# Patient Record
Sex: FEMALE | Race: BLACK OR AFRICAN AMERICAN | Employment: FULL TIME | URBAN - METROPOLITAN AREA
[De-identification: names, ages, dates, MRNs, and addresses within clinical notes are randomized per-mention and may not be internally consistent; named-entity substitution may affect disease eponyms.]

---

## 2017-02-03 RX ORDER — TRIAMTERENE AND HYDROCHLOROTHIAZIDE 37.5; 25 MG/1; MG/1
CAPSULE ORAL
Qty: 90 CAPSULE | Refills: 0 | Status: SHIPPED | OUTPATIENT
Start: 2017-02-03 | End: 2017-04-30

## 2017-02-03 NOTE — TELEPHONE ENCOUNTER
Hypertensive Medications  Protocol Criteria:  · Appointment scheduled in the past 6 months or in the next 3 months  · BMP or CMP in the past 12 months  · Creatinine result < 2  Recent Visits       Provider Department Primary Dx    6 months ago Nubia De Paz

## 2017-02-22 ENCOUNTER — HOSPITAL ENCOUNTER (OUTPATIENT)
Age: 47
Discharge: HOME | End: 2017-02-22
Attending: EMERGENCY MEDICINE
Payer: COMMERCIAL

## 2017-02-22 VITALS
DIASTOLIC BLOOD PRESSURE: 69 MMHG | TEMPERATURE: 99 F | HEART RATE: 99 BPM | WEIGHT: 147 LBS | SYSTOLIC BLOOD PRESSURE: 108 MMHG | OXYGEN SATURATION: 97 % | HEIGHT: 62 IN | BODY MASS INDEX: 27.05 KG/M2 | RESPIRATION RATE: 12 BRPM

## 2017-02-22 DIAGNOSIS — J06.9 UPPER RESPIRATORY INFECTION WITH COUGH AND CONGESTION: Primary | ICD-10-CM

## 2017-02-22 PROCEDURE — 99213 OFFICE O/P EST LOW 20 MIN: CPT

## 2017-02-22 PROCEDURE — 99204 OFFICE O/P NEW MOD 45 MIN: CPT

## 2017-02-22 PROCEDURE — 99214 OFFICE O/P EST MOD 30 MIN: CPT

## 2017-02-22 RX ORDER — PSEUDOEPHEDRINE HYDROCHLORIDE 30 MG/1
30 TABLET ORAL EVERY 4 HOURS PRN
Qty: 36 TABLET | Refills: 0 | Status: SHIPPED | OUTPATIENT
Start: 2017-02-22 | End: 2017-07-21

## 2017-02-22 RX ORDER — BENZONATATE 200 MG/1
200 CAPSULE ORAL 3 TIMES DAILY PRN
Qty: 15 CAPSULE | Refills: 0 | Status: SHIPPED | OUTPATIENT
Start: 2017-02-22 | End: 2017-02-27

## 2017-02-22 RX ORDER — FLUTICASONE PROPIONATE 50 MCG
1-2 SPRAY, SUSPENSION (ML) NASAL DAILY
Qty: 16 G | Refills: 0 | Status: SHIPPED | OUTPATIENT
Start: 2017-02-22 | End: 2017-03-24

## 2017-02-22 NOTE — ED PROVIDER NOTES
Patient Seen in: [facility]    History   Patient presents with:  Cough/URI    Stated Complaint: Headache; Congestion    HPI    For the last 3 or 4 days patient has had a runny nose, postnasal drip and a cough.   Patient is complaint: Headache; Congestion  Other systems are as noted in HPI. Constitutional and vital signs reviewed. All other systems reviewed and negative except as noted above.     PSFH elements reviewed from today and agreed except as otherwise stated in behavior is normal. Judgment normal.   Nursing note and vitals reviewed.       ED Course   Labs Reviewed - No data to display    MDM           Disposition and Plan     Clinical Impression:  Upper respiratory infection with cough and congestion  (primary enc

## 2017-05-02 RX ORDER — TRIAMTERENE AND HYDROCHLOROTHIAZIDE 37.5; 25 MG/1; MG/1
CAPSULE ORAL
Qty: 90 CAPSULE | Refills: 0 | Status: SHIPPED | OUTPATIENT
Start: 2017-05-02 | End: 2018-07-24

## 2017-05-12 ENCOUNTER — OFFICE VISIT (OUTPATIENT)
Dept: DERMATOLOGY CLINIC | Facility: CLINIC | Age: 47
End: 2017-05-12

## 2017-05-12 DIAGNOSIS — L65.9 ALOPECIA: Primary | ICD-10-CM

## 2017-05-12 PROCEDURE — 99212 OFFICE O/P EST SF 10 MIN: CPT | Performed by: DERMATOLOGY

## 2017-05-12 PROCEDURE — 99202 OFFICE O/P NEW SF 15 MIN: CPT | Performed by: DERMATOLOGY

## 2017-05-29 NOTE — PROGRESS NOTES
Marshall Thomas is a 49year old female. Patient presents with:  Derm Problem: New pt presents with hair loss to top of scalp for 1 month. Pt has been using johan oil that helped with some hair regrowth. Issue began 3 days after hot oil treatment. Diagnosis Date   • Hemorrhoid 1999     hemorrhoidectomy   • Lipid screening 4/18/2014     per NG   • Essential hypertension          Past Surgical History    HEMORRHOIDECTOMY  2002       Social History   Marital Status: Single  Spouse Name: N/A    Years well-nourished, alert oriented in no acute distress.   Exam of involved, appropriate areas of skin performed, including scalp, head, neck, face,nails, hair, external eyes, including conjunctival mucosa, eyelids, lips, external ears, back, chest, abdomen, ar prescriptions requested or ordered in this encounter    Alopecia  (primary encounter diagnosis)    No orders of the defined types were placed in this encounter.        Results From Past 48 Hours:  No results found for this or any previous visit (from the pa

## 2017-07-21 ENCOUNTER — OFFICE VISIT (OUTPATIENT)
Dept: INTERNAL MEDICINE CLINIC | Facility: CLINIC | Age: 47
End: 2017-07-21

## 2017-07-21 VITALS
WEIGHT: 153 LBS | HEART RATE: 89 BPM | HEIGHT: 63.5 IN | BODY MASS INDEX: 26.77 KG/M2 | SYSTOLIC BLOOD PRESSURE: 86 MMHG | TEMPERATURE: 98 F | DIASTOLIC BLOOD PRESSURE: 53 MMHG

## 2017-07-21 DIAGNOSIS — Z00.00 PHYSICAL EXAM: Primary | ICD-10-CM

## 2017-07-21 DIAGNOSIS — Z12.31 VISIT FOR SCREENING MAMMOGRAM: ICD-10-CM

## 2017-07-21 PROCEDURE — 99396 PREV VISIT EST AGE 40-64: CPT | Performed by: INTERNAL MEDICINE

## 2017-07-23 NOTE — PROGRESS NOTES
HPI:    Patient ID: Shayna Marcelo is a 49year old female.     HPI    Physical exam    Denies hx of HTN  Dyazide is for occas leg swelling    BP (!) 86/53 (BP Location: Left arm, Patient Position: Sitting, Cuff Size: adult)   Pulse 89   Temp 98.4 °F (36.9 VITACRAVES) Oral Chew Tab Chew  by mouth.  Disp:  Rfl:      Allergies:No Known Allergies    HISTORY:  Past Medical History:   Diagnosis Date   • Essential hypertension    • Hemorrhoid 1999    hemorrhoidectomy   • Lipid screening 4/18/2014    per [facility] ASSAY, THYROID STIM HORMONE, FREE T4 (FREE         THYROXINE), LIPID PANEL, CBC, PLATELET; NO         DIFFERENTIAL, COMP METABOLIC PANEL (14),         URINALYSIS, ROUTINE        Generally healthy  BP is low  Pepe hx of HTN  Legs no swelling  STOP dyazied

## 2017-07-24 ENCOUNTER — HOSPITAL ENCOUNTER (OUTPATIENT)
Dept: MAMMOGRAPHY | Age: 47
Discharge: HOME | End: 2017-07-24
Attending: INTERNAL MEDICINE
Payer: COMMERCIAL

## 2017-07-24 ENCOUNTER — LAB ENCOUNTER (OUTPATIENT)
Dept: LAB | Age: 47
End: 2017-07-24
Attending: INTERNAL MEDICINE
Payer: COMMERCIAL

## 2017-07-24 DIAGNOSIS — R82.90 ABNORMAL FINDING IN URINE: Primary | ICD-10-CM

## 2017-07-24 DIAGNOSIS — Z12.31 VISIT FOR SCREENING MAMMOGRAM: ICD-10-CM

## 2017-07-24 DIAGNOSIS — Z00.00 PHYSICAL EXAM: ICD-10-CM

## 2017-07-24 LAB
ALBUMIN SERPL BCP-MCNC: 3.7 G/DL (ref 3.5–4.8)
ALBUMIN/GLOB SERPL: 1 {RATIO} (ref 1–2)
ALP SERPL-CCNC: 68 U/L (ref 32–100)
ALT SERPL-CCNC: 20 U/L (ref 14–54)
ANION GAP SERPL CALC-SCNC: 6 MMOL/L (ref 0–18)
AST SERPL-CCNC: 23 U/L (ref 15–41)
BILIRUB SERPL-MCNC: 0.5 MG/DL (ref 0.3–1.2)
BILIRUB UR QL: NEGATIVE
BUN SERPL-MCNC: 8 MG/DL (ref 8–20)
BUN/CREAT SERPL: 9.2 (ref 10–20)
CALCIUM SERPL-MCNC: 8.9 MG/DL (ref 8.5–10.5)
CHLORIDE SERPL-SCNC: 108 MMOL/L (ref 95–110)
CHOLEST SERPL-MCNC: 196 MG/DL (ref 110–200)
CO2 SERPL-SCNC: 26 MMOL/L (ref 22–32)
COLOR UR: YELLOW
CREAT SERPL-MCNC: 0.87 MG/DL (ref 0.5–1.5)
ERYTHROCYTE [DISTWIDTH] IN BLOOD BY AUTOMATED COUNT: 13 % (ref 11–15)
GLOBULIN PLAS-MCNC: 3.6 G/DL (ref 2.5–3.7)
GLUCOSE SERPL-MCNC: 95 MG/DL (ref 70–99)
GLUCOSE UR-MCNC: NEGATIVE MG/DL
HCT VFR BLD AUTO: 38.4 % (ref 35–48)
HDLC SERPL-MCNC: 55 MG/DL
HGB BLD-MCNC: 12.9 G/DL (ref 12–16)
KETONES UR-MCNC: NEGATIVE MG/DL
LDLC SERPL CALC-MCNC: 124 MG/DL (ref 0–99)
LEUKOCYTE ESTERASE UR QL STRIP.AUTO: NEGATIVE
MCH RBC QN AUTO: 27.7 PG (ref 27–32)
MCHC RBC AUTO-ENTMCNC: 33.7 G/DL (ref 32–37)
MCV RBC AUTO: 82.2 FL (ref 80–100)
NITRITE UR QL STRIP.AUTO: NEGATIVE
NONHDLC SERPL-MCNC: 141 MG/DL
OSMOLALITY UR CALC.SUM OF ELEC: 288 MOSM/KG (ref 275–295)
PH UR: 5 [PH] (ref 5–8)
PLATELET # BLD AUTO: 239 K/UL (ref 140–400)
PMV BLD AUTO: 8.3 FL (ref 7.4–10.3)
POTASSIUM SERPL-SCNC: 3.9 MMOL/L (ref 3.3–5.1)
PROT SERPL-MCNC: 7.3 G/DL (ref 5.9–8.4)
PROT UR-MCNC: 30 MG/DL
RBC # BLD AUTO: 4.67 M/UL (ref 3.7–5.4)
RBC #/AREA URNS AUTO: 41 /HPF
SODIUM SERPL-SCNC: 140 MMOL/L (ref 136–144)
SP GR UR STRIP: 1.02 (ref 1–1.03)
T4 FREE SERPL-MCNC: 0.81 NG/DL (ref 0.58–1.64)
TRIGL SERPL-MCNC: 83 MG/DL (ref 1–149)
TSH SERPL-ACNC: 0.98 UIU/ML (ref 0.45–5.33)
UROBILINOGEN UR STRIP-ACNC: <2
VIT C UR-MCNC: NEGATIVE MG/DL
WBC # BLD AUTO: 7.4 K/UL (ref 4–11)
WBC #/AREA URNS AUTO: 2 /HPF

## 2017-07-24 PROCEDURE — 77067 SCR MAMMO BI INCL CAD: CPT | Performed by: INTERNAL MEDICINE

## 2017-07-31 ENCOUNTER — TELEPHONE (OUTPATIENT)
Dept: FAMILY MEDICINE CLINIC | Facility: CLINIC | Age: 47
End: 2017-07-31

## 2017-07-31 ENCOUNTER — OFFICE VISIT (OUTPATIENT)
Dept: OBGYN CLINIC | Facility: CLINIC | Age: 47
End: 2017-07-31

## 2017-07-31 VITALS
HEART RATE: 79 BPM | WEIGHT: 154 LBS | BODY MASS INDEX: 26.62 KG/M2 | HEIGHT: 63.75 IN | SYSTOLIC BLOOD PRESSURE: 104 MMHG | DIASTOLIC BLOOD PRESSURE: 69 MMHG

## 2017-07-31 DIAGNOSIS — Z12.4 SCREENING FOR MALIGNANT NEOPLASM OF CERVIX: ICD-10-CM

## 2017-07-31 DIAGNOSIS — Z01.419 ENCOUNTER FOR GYNECOLOGICAL EXAMINATION WITHOUT ABNORMAL FINDING: Primary | ICD-10-CM

## 2017-07-31 PROCEDURE — 99396 PREV VISIT EST AGE 40-64: CPT | Performed by: OBSTETRICS & GYNECOLOGY

## 2017-07-31 NOTE — TELEPHONE ENCOUNTER
----- Message from Flakito Bolanos MD sent at 7/31/2017 12:01 AM CDT -----  Send letter and copy of test result. MICORSCOPIC HEMATURIA  Repeat Urinalysis and send culture  Was she menstruating?     Other albs are within normal excpet  LDL slightly eleavted

## 2017-07-31 NOTE — H&P
HPI:  The patient is a 48 yo F who presents for WWE. No complaints. LMP 7/23/17. Menses q28d/4-5d/normal flow. Had 1 episode of intermenstrual bleeding in May, but none prior to or since. Denies perimenopausal symptoms.   Currently not sexually activ mouth., Disp: , Rfl:     ALLERGIES:  No Known Allergies      Review of Systems:  Constitutional:  Denies fevers and chills   Cardiovascular:  denies chest pain or palpitations  Respiratory:  denies shortness of breath  Gastrointestinal:  denies heartburn, HPV HIGH RISK , THIN PREP COLLECTION; Future  -     HPV HIGH RISK , THIN PREP COLLECTION  -     THINPREP PAP SMEAR B        1. WWE:   1. Reviewed ASCCP guidelines with the patient -- pap collected  2. Contraception: not sexually active  3.  Breast Health:

## 2017-07-31 NOTE — TELEPHONE ENCOUNTER
Dr Onesimo Nathan,    Results reviewed with pt and confirmed she was menstruating at that time. Do you still want the urinalysis and culture done? I entered it but have not mailed to pt as of yet.

## 2017-08-01 ENCOUNTER — TELEPHONE (OUTPATIENT)
Dept: OBGYN CLINIC | Facility: CLINIC | Age: 47
End: 2017-08-01

## 2017-08-01 LAB — HPV I/H RISK 1 DNA SPEC QL NAA+PROBE: NEGATIVE

## 2017-08-01 NOTE — TELEPHONE ENCOUNTER
----- Message from Brady Bennett DO sent at 8/1/2017  1:37 PM CDT -----  Please notify of normal pap smear and hpv screen. Thanks!

## 2017-09-20 ENCOUNTER — NURSE TRIAGE (OUTPATIENT)
Dept: OTHER | Age: 47
End: 2017-09-20

## 2017-09-20 NOTE — TELEPHONE ENCOUNTER
Action Requested: Summary for Provider     []  Critical Lab, Recommendations Needed  [] Need Additional Advice  []   FYI    []   Need Orders  [] Need Medications Sent to Pharmacy  [x]  Other     SUMMARY: Patient requesting appointment tomorrow after 3 p.m.

## 2017-09-21 ENCOUNTER — OFFICE VISIT (OUTPATIENT)
Dept: INTERNAL MEDICINE CLINIC | Facility: CLINIC | Age: 47
End: 2017-09-21

## 2017-09-21 VITALS
WEIGHT: 153 LBS | BODY MASS INDEX: 27.11 KG/M2 | HEIGHT: 63 IN | DIASTOLIC BLOOD PRESSURE: 71 MMHG | HEART RATE: 79 BPM | SYSTOLIC BLOOD PRESSURE: 108 MMHG | TEMPERATURE: 98 F

## 2017-09-21 DIAGNOSIS — H91.91 HEARING DIFFICULTY OF RIGHT EAR: Primary | ICD-10-CM

## 2017-09-21 DIAGNOSIS — H61.21 IMPACTED CERUMEN OF RIGHT EAR: ICD-10-CM

## 2017-09-21 PROCEDURE — 99212 OFFICE O/P EST SF 10 MIN: CPT | Performed by: INTERNAL MEDICINE

## 2017-09-21 PROCEDURE — 99213 OFFICE O/P EST LOW 20 MIN: CPT | Performed by: INTERNAL MEDICINE

## 2017-09-21 NOTE — PATIENT INSTRUCTIONS
Earwax Removal    The ear canal makes earwax from the canal’s lining. The ears make wax to lubricate and protect the ear canal. The ear canal is the tube that connects the middle ear to the outside of the ear.  The wax protects the ear from bacteria, infe · Don’t use cotton swabs in your ears. Cotton swabs may push wax deeper into the ear canal or damage the eardrum.  Use cotton gauze or a wet washcloth  to gently remove wax on the outside of the ear and around the opening to the ear canal.  · Don't use any © 5482-9260 [address], [address] [place]. All rights reserved. This information is not intended as a substitute for professional medical care. Always follow your healthcare professional's instructions.

## 2017-09-21 NOTE — PROGRESS NOTES
Patient ID: Yusef Lake is a 49year old female. Patient presents with:  Ear Problem: unable to hear out of R ear. Declines flu shot. HISTORY OF PRESENT ILLNESS:   HPI  Patient presents for above.   Having difficulty hearing out of right ear as Reaction to local anesthetic No     Social History Narrative   None on file           PHYSICAL EXAM:      09/21/17  1554   BP: 108/71   Pulse: 79   Temp: 97.8 °F (36.6 °C)   TempSrc: Oral   Weight: 153 lb (69.4 kg)   Height: 5' 3\" (1.6 m)       Body mass

## 2017-09-26 ENCOUNTER — OFFICE VISIT (OUTPATIENT)
Dept: OTOLARYNGOLOGY | Facility: CLINIC | Age: 47
End: 2017-09-26

## 2017-09-26 VITALS
DIASTOLIC BLOOD PRESSURE: 60 MMHG | SYSTOLIC BLOOD PRESSURE: 104 MMHG | HEIGHT: 64 IN | BODY MASS INDEX: 26.12 KG/M2 | WEIGHT: 153 LBS | TEMPERATURE: 98 F

## 2017-09-26 DIAGNOSIS — H61.21 IMPACTED CERUMEN OF RIGHT EAR: Primary | ICD-10-CM

## 2017-09-26 PROCEDURE — 69210 REMOVE IMPACTED EAR WAX UNI: CPT | Performed by: OTOLARYNGOLOGY

## 2017-09-26 PROCEDURE — 99243 OFF/OP CNSLTJ NEW/EST LOW 30: CPT | Performed by: OTOLARYNGOLOGY

## 2017-09-26 PROCEDURE — 99212 OFFICE O/P EST SF 10 MIN: CPT | Performed by: OTOLARYNGOLOGY

## 2017-09-26 NOTE — PATIENT INSTRUCTIONS
Earwax Removal    The ear canal makes earwax from the canal’s lining. The ears make wax to lubricate and protect the ear canal. The ear canal is the tube that connects the middle ear to the outside of the ear.  The wax protects the ear from bacteria, infe · Don’t use cotton swabs in your ears. Cotton swabs may push wax deeper into the ear canal or damage the eardrum.  Use cotton gauze or a wet washcloth  to gently remove wax on the outside of the ear and around the opening to the ear canal.  · Don't use any © 6610-8822 [address], [address] [place]. All rights reserved. This information is not intended as a substitute for professional medical care. Always follow your healthcare professional's instructions.

## 2017-09-26 NOTE — PROGRESS NOTES
Joo Gallagher is a 49year old female. Patient presents with:  Cerumen Impaction: patient presents for ear cleaning    HPI:   Right ear blocked for the last few weeks.  No pain initially but became somewhat painful a few days ago    Current Outpatient Pre Oriented to time, place, person & situation. Appropriate mood and affect. Lymph Detail Normal Submental. Submandibular. Anterior cervical. Posterior cervical. Supraclavicular.    Eyes Normal Conjunctiva - Right: Normal, Left: Normal. Pupil - Right: Normal

## 2018-07-05 ENCOUNTER — CHARTING TRANS (OUTPATIENT)
Dept: OTHER | Age: 48
End: 2018-07-05

## 2018-07-05 ENCOUNTER — LAB SERVICES (OUTPATIENT)
Dept: OTHER | Age: 48
End: 2018-07-05

## 2018-07-05 LAB — RAPID STREP GROUP A: POSITIVE

## 2018-07-07 ENCOUNTER — TELEPHONE (OUTPATIENT)
Dept: OBGYN CLINIC | Facility: CLINIC | Age: 48
End: 2018-07-07

## 2018-07-07 NOTE — TELEPHONE ENCOUNTER
Pt had her last Annual 7/31/17 with Rosetta Minaya and pap and HPV from 7/31/17 were normal.    Pt wants to know when she would be due to have another pap done. Pt has an Annual on 7/27/18. Sent to Rosetta Minaya for he did her pap 7/31/17.

## 2018-07-09 NOTE — TELEPHONE ENCOUNTER
PT NOTIFIED. PT STATES NO ABNORMAL PAP IN PAST 5 YEARS SO IS AWARE SHE MAY NOT NEED ANOTHER PAP FOR A FEW YEARS BUT DR WILL DECIDE AT THE APPT. EXPLAINED NEED FOR YEARLY EXAMS WHEN PAP MAY NOT BE DUE. PT VERBALIZED UNDERSTANDING.   ENCOURAGED TO CALL YOSELIN

## 2018-07-24 ENCOUNTER — OFFICE VISIT (OUTPATIENT)
Dept: INTERNAL MEDICINE CLINIC | Facility: CLINIC | Age: 48
End: 2018-07-24
Payer: COMMERCIAL

## 2018-07-24 VITALS
BODY MASS INDEX: 26.12 KG/M2 | HEART RATE: 77 BPM | WEIGHT: 153 LBS | TEMPERATURE: 98 F | DIASTOLIC BLOOD PRESSURE: 71 MMHG | SYSTOLIC BLOOD PRESSURE: 110 MMHG | HEIGHT: 64 IN

## 2018-07-24 DIAGNOSIS — Z12.31 VISIT FOR SCREENING MAMMOGRAM: ICD-10-CM

## 2018-07-24 DIAGNOSIS — Z00.00 PHYSICAL EXAM: Primary | ICD-10-CM

## 2018-07-24 PROCEDURE — 99396 PREV VISIT EST AGE 40-64: CPT | Performed by: INTERNAL MEDICINE

## 2018-07-27 ENCOUNTER — OFFICE VISIT (OUTPATIENT)
Dept: OBGYN CLINIC | Facility: CLINIC | Age: 48
End: 2018-07-27
Payer: COMMERCIAL

## 2018-07-27 VITALS
BODY MASS INDEX: 27.09 KG/M2 | HEART RATE: 86 BPM | WEIGHT: 154.81 LBS | DIASTOLIC BLOOD PRESSURE: 67 MMHG | HEIGHT: 63.5 IN | SYSTOLIC BLOOD PRESSURE: 102 MMHG

## 2018-07-27 DIAGNOSIS — Z01.419 ENCOUNTER FOR GYNECOLOGICAL EXAMINATION WITHOUT ABNORMAL FINDING: Primary | ICD-10-CM

## 2018-07-27 PROCEDURE — 99396 PREV VISIT EST AGE 40-64: CPT | Performed by: OBSTETRICS & GYNECOLOGY

## 2018-07-27 NOTE — PROGRESS NOTES
Jennifer Stapleton is a 51year old female [facility] Patient's last menstrual period was 07/17/2018 (exact date). Patient presents with:  Gyn Exam: annual exam, colonoscopy question  She has no complaints.   She has occasional hot flashes but her cycles are reg or double vision  Cardiovascular:  denies chest pain or palpitations  Respiratory:  denies shortness of breath  Gastrointestinal:  denies heartburn, abdominal pain, diarrhea or constipation  Genitourinary:  denies dysuria, incontinence, abnormal vaginal di [id number] in 3 years ,rtc 1 year for annual exam, mammogram ordered by her pcp    No orders of the defined types were placed in this encounter.         No prescriptions requested or ordered in this encounter    None

## 2018-07-31 ENCOUNTER — HOSPITAL ENCOUNTER (OUTPATIENT)
Dept: MAMMOGRAPHY | Age: 48
Discharge: HOME | End: 2018-07-31
Attending: INTERNAL MEDICINE
Payer: COMMERCIAL

## 2018-07-31 ENCOUNTER — APPOINTMENT (OUTPATIENT)
Dept: LAB | Age: 48
End: 2018-07-31
Attending: INTERNAL MEDICINE
Payer: COMMERCIAL

## 2018-07-31 DIAGNOSIS — Z12.31 VISIT FOR SCREENING MAMMOGRAM: ICD-10-CM

## 2018-07-31 DIAGNOSIS — R82.90 ABNORMAL FINDING IN URINE: ICD-10-CM

## 2018-07-31 DIAGNOSIS — Z00.00 PHYSICAL EXAM: ICD-10-CM

## 2018-07-31 LAB
ALBUMIN SERPL BCP-MCNC: 3.9 G/DL (ref 3.5–4.8)
ALBUMIN/GLOB SERPL: 1.1 {RATIO} (ref 1–2)
ALP SERPL-CCNC: 67 U/L (ref 32–100)
ALT SERPL-CCNC: 18 U/L (ref 14–54)
ANION GAP SERPL CALC-SCNC: 6 MMOL/L (ref 0–18)
AST SERPL-CCNC: 21 U/L (ref 15–41)
BACTERIA UR QL AUTO: NEGATIVE /HPF
BILIRUB SERPL-MCNC: 0.4 MG/DL (ref 0.3–1.2)
BILIRUB UR QL: NEGATIVE
BUN SERPL-MCNC: 6 MG/DL (ref 8–20)
BUN/CREAT SERPL: 7 (ref 10–20)
CALCIUM SERPL-MCNC: 9.2 MG/DL (ref 8.5–10.5)
CHLORIDE SERPL-SCNC: 105 MMOL/L (ref 95–110)
CHOLEST SERPL-MCNC: 196 MG/DL (ref 110–200)
CLARITY UR: CLEAR
CO2 SERPL-SCNC: 26 MMOL/L (ref 22–32)
COLOR UR: YELLOW
CREAT SERPL-MCNC: 0.86 MG/DL (ref 0.5–1.5)
ERYTHROCYTE [DISTWIDTH] IN BLOOD BY AUTOMATED COUNT: 13.7 % (ref 11–15)
GLOBULIN PLAS-MCNC: 3.5 G/DL (ref 2.5–3.7)
GLUCOSE SERPL-MCNC: 90 MG/DL (ref 70–99)
GLUCOSE UR-MCNC: NEGATIVE MG/DL
HBA1C MFR BLD: 5.3 % (ref 4–6)
HCT VFR BLD AUTO: 38.8 % (ref 35–48)
HDLC SERPL-MCNC: 59 MG/DL
HGB BLD-MCNC: 12.9 G/DL (ref 12–16)
KETONES UR-MCNC: NEGATIVE MG/DL
LDLC SERPL CALC-MCNC: 118 MG/DL (ref 0–99)
LEUKOCYTE ESTERASE UR QL STRIP.AUTO: NEGATIVE
MCH RBC QN AUTO: 28.3 PG (ref 27–32)
MCHC RBC AUTO-ENTMCNC: 33.3 G/DL (ref 32–37)
MCV RBC AUTO: 85.1 FL (ref 80–100)
NITRITE UR QL STRIP.AUTO: NEGATIVE
NONHDLC SERPL-MCNC: 137 MG/DL
OSMOLALITY UR CALC.SUM OF ELEC: 281 MOSM/KG (ref 275–295)
PATIENT FASTING: YES
PH UR: 5 [PH] (ref 5–8)
PLATELET # BLD AUTO: 269 K/UL (ref 140–400)
PMV BLD AUTO: 7.8 FL (ref 7.4–10.3)
POTASSIUM SERPL-SCNC: 4 MMOL/L (ref 3.3–5.1)
PROT SERPL-MCNC: 7.4 G/DL (ref 5.9–8.4)
PROT UR-MCNC: NEGATIVE MG/DL
RBC # BLD AUTO: 4.56 M/UL (ref 3.7–5.4)
RBC #/AREA URNS AUTO: 1 /HPF
RBC #/AREA URNS AUTO: <1 /HPF
SODIUM SERPL-SCNC: 137 MMOL/L (ref 136–144)
SP GR UR STRIP: 1.02 (ref 1–1.03)
T4 FREE SERPL-MCNC: 0.65 NG/DL (ref 0.58–1.64)
TRIGL SERPL-MCNC: 95 MG/DL (ref 1–149)
TSH SERPL-ACNC: 0.44 UIU/ML (ref 0.45–5.33)
UROBILINOGEN UR STRIP-ACNC: <2
VIT C UR-MCNC: NEGATIVE MG/DL
WBC # BLD AUTO: 6.2 K/UL (ref 4–11)
WBC #/AREA URNS AUTO: <1 /HPF
WBC #/AREA URNS AUTO: <1 /HPF

## 2018-07-31 PROCEDURE — 85027 COMPLETE CBC AUTOMATED: CPT

## 2018-07-31 PROCEDURE — 36415 COLL VENOUS BLD VENIPUNCTURE: CPT

## 2018-07-31 PROCEDURE — 87086 URINE CULTURE/COLONY COUNT: CPT

## 2018-07-31 PROCEDURE — 81001 URINALYSIS AUTO W/SCOPE: CPT

## 2018-07-31 PROCEDURE — 80061 LIPID PANEL: CPT

## 2018-07-31 PROCEDURE — 81015 MICROSCOPIC EXAM OF URINE: CPT

## 2018-07-31 PROCEDURE — 83036 HEMOGLOBIN GLYCOSYLATED A1C: CPT

## 2018-07-31 PROCEDURE — 80053 COMPREHEN METABOLIC PANEL: CPT

## 2018-07-31 PROCEDURE — 84439 ASSAY OF FREE THYROXINE: CPT

## 2018-07-31 PROCEDURE — 77063 BREAST TOMOSYNTHESIS BI: CPT | Performed by: INTERNAL MEDICINE

## 2018-07-31 PROCEDURE — 77067 SCR MAMMO BI INCL CAD: CPT | Performed by: INTERNAL MEDICINE

## 2018-07-31 PROCEDURE — 84443 ASSAY THYROID STIM HORMONE: CPT

## 2018-08-07 NOTE — PROGRESS NOTES
HPI:    Patient ID: Ian Ward is a 51year old female.     HPI    Physical exam    /71   Pulse 77   Temp 98.4 °F (36.9 °C) (Oral)   Ht 5' 4\" (1.626 m)   Wt 153 lb (69.4 kg)   LMP 07/17/2018 (Exact Date)   BMI 26.26 kg/m²   Wt Readings from Last Problem Relation Age of Onset   • Hypertension Mother    • Breast Cancer Mother 77   • Cancer Maternal Grandmother 61   • Diabetes Paternal Aunt    • Breast Cancer Paternal Aunt 48   • Breast Cancer Paternal Aunt 61      Social History: Smoking status: N DIFFERENTIAL, COMP METABOLIC PANEL (14), FREE         T4 (FREE THYROXINE), LIPID PANEL, URINE         MICROSCOPIC W REFLEX CULTURE, HEMOGLOBIN A1C        Generally healthy        (Z12.31) Visit for screening mammogram  Plan: [facility] 2D+3D SCREENING BILAT

## 2018-10-31 VITALS
SYSTOLIC BLOOD PRESSURE: 116 MMHG | RESPIRATION RATE: 16 BRPM | HEIGHT: 62 IN | HEART RATE: 82 BPM | TEMPERATURE: 98.5 F | OXYGEN SATURATION: 98 % | DIASTOLIC BLOOD PRESSURE: 80 MMHG | BODY MASS INDEX: 28.4 KG/M2 | WEIGHT: 154.32 LBS

## 2019-02-06 ENCOUNTER — TELEPHONE (OUTPATIENT)
Dept: INTERNAL MEDICINE CLINIC | Facility: CLINIC | Age: 49
End: 2019-02-06

## 2019-02-06 NOTE — TELEPHONE ENCOUNTER
She is PPO  Advise  Make an appt with GI  And discuss colonoscopy  Call [phone]   Colonoscopy cannot just be ordered it is a procedure   Consultation with GI required  And indication for procedure and risk benefit discussed during the visit

## 2019-02-19 ENCOUNTER — TELEPHONE (OUTPATIENT)
Dept: INTERNAL MEDICINE CLINIC | Facility: CLINIC | Age: 49
End: 2019-02-19

## 2019-02-19 ENCOUNTER — NURSE TRIAGE (OUTPATIENT)
Dept: FAMILY MEDICINE CLINIC | Facility: CLINIC | Age: 49
End: 2019-02-19

## 2019-02-19 ENCOUNTER — OFFICE VISIT (OUTPATIENT)
Dept: INTERNAL MEDICINE CLINIC | Facility: CLINIC | Age: 49
End: 2019-02-19
Payer: COMMERCIAL

## 2019-02-19 VITALS
TEMPERATURE: 99 F | HEIGHT: 63.5 IN | SYSTOLIC BLOOD PRESSURE: 92 MMHG | WEIGHT: 149 LBS | HEART RATE: 89 BPM | BODY MASS INDEX: 26.08 KG/M2 | DIASTOLIC BLOOD PRESSURE: 58 MMHG

## 2019-02-19 DIAGNOSIS — J02.0 PHARYNGITIS, STREPTOCOCCAL: Primary | ICD-10-CM

## 2019-02-19 PROCEDURE — 99213 OFFICE O/P EST LOW 20 MIN: CPT | Performed by: INTERNAL MEDICINE

## 2019-02-19 PROCEDURE — 99212 OFFICE O/P EST SF 10 MIN: CPT | Performed by: INTERNAL MEDICINE

## 2019-02-19 RX ORDER — AMOXICILLIN AND CLAVULANATE POTASSIUM 875; 125 MG/1; MG/1
1 TABLET, FILM COATED ORAL 2 TIMES DAILY
Qty: 14 TABLET | Refills: 0 | Status: SHIPPED | OUTPATIENT
Start: 2019-02-19 | End: 2019-02-26

## 2019-02-19 NOTE — TELEPHONE ENCOUNTER
Action Requested: Summary for Provider     []  Critical Lab, Recommendations Needed  [] Need Additional Advice  []   FYI    []   Need Orders  [] Need Medications Sent to Pharmacy  []  Other     SUMMARY: Spoke with patient who reports she had a sinus issue

## 2019-02-19 NOTE — PROGRESS NOTES
Patient ID: Patrick Adame is a 51year old female. Patient presents with:  Sore Throat: sore throat, chills, started Saturday.         HISTORY OF PRESENT ILLNESS:   HPI  3 day(s) ago  Fever - Yes, Tmax (102)  Cough - Yes, productive - No, color - N/A  S tobacco: Never Used    Substance and Sexual Activity      Alcohol use: No        Alcohol/week: 0.0 oz      Drug use: No      Sexual activity: Yes        Partners: Male        Birth control/protection: Condom        Comment: none    Lifestyle      Physical appears well-developed and well-nourished. HENT:   Head: Normocephalic.    Right Ear: Tympanic membrane, external ear and ear canal normal.   Left Ear: Tympanic membrane, external ear and ear canal normal.   Nose: Right sinus exhibits no maxillary sinus t

## 2019-02-19 NOTE — PATIENT INSTRUCTIONS
Pharyngitis: Strep (Presumed)    You have pharyngitis (sore throat). The healthcare staff think your sore throat is caused by streptococcus (strep) bacteria. This is often called strep throat.  Strep throat can cause throat pain that is worse when swallow · Lymph nodes that get larger  · Can’t swallow liquids, a lot of drooling, or can’t open mouth wide due to throat pain  · Signs of dehydration, such as very dark urine or no urine, sunken eyes, dizziness  · Trouble breathing or noisy breathing  · Muffled v

## 2019-02-19 NOTE — TELEPHONE ENCOUNTER
Pt called in requesting to have her doctors note from today altered to excuse her from work for the dates of 2/19-2/20. Pt stated that she will return to work on 2/21. Pt is requesting to have her note sent to [facility].

## 2019-02-27 ENCOUNTER — NURSE ONLY (OUTPATIENT)
Dept: GASTROENTEROLOGY | Facility: CLINIC | Age: 49
End: 2019-02-27

## 2019-02-27 NOTE — PROGRESS NOTES
Questionnaire still not filled out. Left detailed message on patient's voicemail that telephone screening needs to be rescheduled or needs to make appointment with nurse practitioner.

## 2019-02-27 NOTE — PROGRESS NOTES
Left message for patient on voicemail  that she did not complete both questionnaires and will either need to complete them today by 4 pm  or need to reschedule.

## 2019-04-16 ENCOUNTER — TELEPHONE (OUTPATIENT)
Dept: INTERNAL MEDICINE CLINIC | Facility: CLINIC | Age: 49
End: 2019-04-16

## 2019-04-16 DIAGNOSIS — Z12.31 VISIT FOR SCREENING MAMMOGRAM: Primary | ICD-10-CM

## 2019-04-22 ENCOUNTER — NURSE ONLY (OUTPATIENT)
Dept: GASTROENTEROLOGY | Facility: CLINIC | Age: 49
End: 2019-04-22

## 2019-04-22 DIAGNOSIS — Z12.11 COLON CANCER SCREENING: Primary | ICD-10-CM

## 2019-04-22 RX ORDER — POLYETHYLENE GLYCOL 3350, SODIUM CHLORIDE, SODIUM BICARBONATE, POTASSIUM CHLORIDE 420; 11.2; 5.72; 1.48 G/4L; G/4L; G/4L; G/4L
POWDER, FOR SOLUTION ORAL
Qty: 1 BOTTLE | Refills: 0 | Status: SHIPPED | OUTPATIENT
Start: 2019-04-22 | End: 2019-05-31 | Stop reason: ALTCHOICE

## 2019-04-22 NOTE — PROGRESS NOTES
46 y/o F patient of Dr. Emily Rosales - no prior CLN, no reported GI sxs. + hx of hemorrhoidectomy. Allergies/meds reviewed. No anemia on 2018 labs. Advise that the patient undergo screening CLN with IV or MAC anesthesia @ [facility], [facility] or [place] with Dr. Britney Crespo.     Savage Bacon

## 2019-04-22 NOTE — PROGRESS NOTES
Forwarded to [facility]. This is pt's first colonoscopy. Meds/allergies reviewed.     Ht-5' 3,5\"--149 lbs--BMI 25.98    Denies:  Any upper or lower GI symptoms  Cardiology problems/CVA/pacemaker/stent  Hypertension  COPD/asthma  Diabetes  Anticoagulants  Sle

## 2019-04-22 NOTE — PROGRESS NOTES
CBLM to schedule procedure. Please transfer to Lina Penny at ext [phone] or [phone] for scheduling. Or please [facility] to [facility] if unavailable.

## 2019-04-22 NOTE — PROGRESS NOTES
Scheduled for:  Colonoscopy - 47387  Provider Name:  Dr. Amarilis Mancia  Date:  6/7/19  Location:  [facility]  Sedation:  IV  Time:  10:45 am (pt is aware to arrive at 9:45 am)  Prep:  Trilyte, Prep instructions were given to pt over the phone, pt verbalized understanding

## 2019-05-31 ENCOUNTER — OFFICE VISIT (OUTPATIENT)
Dept: INTERNAL MEDICINE CLINIC | Facility: CLINIC | Age: 49
End: 2019-05-31
Payer: COMMERCIAL

## 2019-05-31 ENCOUNTER — NURSE TRIAGE (OUTPATIENT)
Dept: OTHER | Age: 49
End: 2019-05-31

## 2019-05-31 ENCOUNTER — HOSPITAL ENCOUNTER (OUTPATIENT)
Dept: GENERAL RADIOLOGY | Facility: HOSPITAL | Age: 49
Discharge: HOME | End: 2019-05-31
Attending: PHYSICIAN ASSISTANT
Payer: COMMERCIAL

## 2019-05-31 VITALS
WEIGHT: 154 LBS | HEIGHT: 63 IN | BODY MASS INDEX: 27.29 KG/M2 | HEART RATE: 91 BPM | DIASTOLIC BLOOD PRESSURE: 72 MMHG | TEMPERATURE: 98 F | SYSTOLIC BLOOD PRESSURE: 111 MMHG

## 2019-05-31 DIAGNOSIS — S92.514S CLOSED NONDISPLACED FRACTURE OF PROXIMAL PHALANX OF LESSER TOE OF RIGHT FOOT, SEQUELA: ICD-10-CM

## 2019-05-31 DIAGNOSIS — S99.921S FOOT INJURY, RIGHT, SEQUELA: ICD-10-CM

## 2019-05-31 DIAGNOSIS — S99.921S FOOT INJURY, RIGHT, SEQUELA: Primary | ICD-10-CM

## 2019-05-31 PROCEDURE — 73630 X-RAY EXAM OF FOOT: CPT | Performed by: PHYSICIAN ASSISTANT

## 2019-05-31 PROCEDURE — 99213 OFFICE O/P EST LOW 20 MIN: CPT | Performed by: PHYSICIAN ASSISTANT

## 2019-05-31 PROCEDURE — 99212 OFFICE O/P EST SF 10 MIN: CPT | Performed by: PHYSICIAN ASSISTANT

## 2019-05-31 NOTE — TELEPHONE ENCOUNTER
Action Requested: Summary for Provider     []  Critical Lab, Recommendations Needed  [] Need Additional Advice  []   FYI    []   Need Orders  [] Need Medications Sent to Pharmacy  []  Other     SUMMARY: Spoke with the patient who reports she hit her 4th di

## 2019-05-31 NOTE — PROGRESS NOTES
HPI:    Patient ID: Alexsandra Ordonez is a 47year old female. HPI   Patient injured her right foot last Sunday. Findings hurt her fourth toe as well as bruised her second toe. Has pain with walking.   Denies any numbness, tingling, bleeding in the are Constitutional: She is oriented to person, place, and time. She appears well-developed and well-nourished. HENT:   Head: Normocephalic and atraumatic. Nose: Nose normal.   Eyes: Pupils are equal, round, and reactive to light.  Conjunctivae and EOM are n

## 2019-06-04 ENCOUNTER — TELEPHONE (OUTPATIENT)
Dept: GASTROENTEROLOGY | Facility: CLINIC | Age: 49
End: 2019-06-04

## 2019-06-04 DIAGNOSIS — Z12.11 COLON CANCER SCREENING: Primary | ICD-10-CM

## 2019-06-04 NOTE — TELEPHONE ENCOUNTER
Rescheduled for:  Colonoscopy - 54310   Provider Name:  Dr. Alex Mendes  Date:  FROM - 6/7/19           TO - 6/21/19  Location:  [facility]  Sedation:  IV  Time:  FROM - 10:45 am           TO - 9:00 am (pt is aware to arrive at 8:00 am)  Prep:  Trilyte, Prep instructio

## 2019-06-04 NOTE — TELEPHONE ENCOUNTER
CBLM to schedule procedure. Please transfer to [email or web address] at ext [phone] or [phone] for scheduling. Or please transfer to [facility] in GI if unavailable.

## 2019-06-05 ENCOUNTER — TELEPHONE (OUTPATIENT)
Dept: GASTROENTEROLOGY | Facility: CLINIC | Age: 49
End: 2019-06-05

## 2019-06-05 DIAGNOSIS — Z12.11 COLON CANCER SCREENING: Primary | ICD-10-CM

## 2019-06-05 NOTE — TELEPHONE ENCOUNTER
Spoke with pt, she will CB to reschedule. Pt CANCELED for her procedure on 6/21/19 at 9:00 am with Dr. Feliberto Dee. I sent an electronic cancel request to Endo Scheduling and received a confirmation today. TE closed.

## 2019-06-06 ENCOUNTER — NURSE TRIAGE (OUTPATIENT)
Dept: INTERNAL MEDICINE CLINIC | Facility: CLINIC | Age: 49
End: 2019-06-06

## 2019-06-06 NOTE — TELEPHONE ENCOUNTER
Action Requested: Summary for Provider     []  Critical Lab, Recommendations Needed  [] Need Additional Advice  [x]   FYI    []   Need Orders  [] Need Medications Sent to Pharmacy  []  Other     SUMMARY: Patient calling with complaint of hair loss on top o

## 2019-06-08 ENCOUNTER — APPOINTMENT (OUTPATIENT)
Dept: LAB | Age: 49
End: 2019-06-08
Attending: INTERNAL MEDICINE
Payer: COMMERCIAL

## 2019-06-08 DIAGNOSIS — R79.89 ABNORMAL THYROID BLOOD TEST: ICD-10-CM

## 2019-06-08 PROCEDURE — 84480 ASSAY TRIIODOTHYRONINE (T3): CPT

## 2019-06-08 PROCEDURE — 36415 COLL VENOUS BLD VENIPUNCTURE: CPT

## 2019-06-08 PROCEDURE — 84443 ASSAY THYROID STIM HORMONE: CPT

## 2019-06-08 PROCEDURE — 80061 LIPID PANEL: CPT

## 2019-06-08 PROCEDURE — 84439 ASSAY OF FREE THYROXINE: CPT

## 2019-06-11 ENCOUNTER — OFFICE VISIT (OUTPATIENT)
Dept: INTERNAL MEDICINE CLINIC | Facility: CLINIC | Age: 49
End: 2019-06-11
Payer: COMMERCIAL

## 2019-06-11 VITALS
BODY MASS INDEX: 26.63 KG/M2 | SYSTOLIC BLOOD PRESSURE: 116 MMHG | HEART RATE: 83 BPM | HEIGHT: 64 IN | WEIGHT: 156 LBS | DIASTOLIC BLOOD PRESSURE: 71 MMHG

## 2019-06-11 DIAGNOSIS — L65.9 HAIR LOSS: Primary | ICD-10-CM

## 2019-06-11 PROCEDURE — 99213 OFFICE O/P EST LOW 20 MIN: CPT | Performed by: INTERNAL MEDICINE

## 2019-06-11 PROCEDURE — 99212 OFFICE O/P EST SF 10 MIN: CPT | Performed by: INTERNAL MEDICINE

## 2019-06-21 ENCOUNTER — LAB ENCOUNTER (OUTPATIENT)
Dept: LAB | Age: 49
End: 2019-06-21
Attending: INTERNAL MEDICINE
Payer: COMMERCIAL

## 2019-06-21 DIAGNOSIS — L65.9 HAIR LOSS: ICD-10-CM

## 2019-06-21 DIAGNOSIS — L65.9 ALOPECIA: ICD-10-CM

## 2019-06-21 PROCEDURE — 36415 COLL VENOUS BLD VENIPUNCTURE: CPT

## 2019-06-21 PROCEDURE — 82306 VITAMIN D 25 HYDROXY: CPT

## 2019-06-21 PROCEDURE — 82728 ASSAY OF FERRITIN: CPT

## 2019-06-21 PROCEDURE — 85027 COMPLETE CBC AUTOMATED: CPT

## 2019-06-21 PROCEDURE — 80053 COMPREHEN METABOLIC PANEL: CPT

## 2019-06-22 NOTE — PROGRESS NOTES
HPI:    Patient ID: Alexsandra Ordonez is a 47year old female.     HPI    Loosing her   Had to cut it short  No scalp lesions noted    /71 (BP Location: Right arm, Patient Position: Sitting, Cuff Size: adult)   Pulse 83   Ht 5' 4\" (1.626 m)   Wt 156 oz    Drug use: No       PHYSICAL EXAM:    Physical Exam   Constitutional: She appears well-developed and well-nourished. HENT:   Head: Normocephalic and atraumatic. Hair thinning   Neck: No thyromegaly present.    Cardiovascular: Normal rate and regula

## 2019-06-24 ENCOUNTER — TELEPHONE (OUTPATIENT)
Dept: OTHER | Age: 49
End: 2019-06-24

## 2019-06-24 NOTE — TELEPHONE ENCOUNTER
Advised patient of Dr Xi Bates  note. Patient verbalized understanding and had no further questions.

## 2019-06-24 NOTE — PROGRESS NOTES
Let patient know labs did not show iron deficiency. Also, her Vitamin D level was within normal limits, although this was a \"low normal\" value. It is possible vitamin D supplementation could help alleviate her hair loss issue, but probably not totally.

## 2019-06-25 NOTE — PROGRESS NOTES
Spoke with pt informing of results and Dr. Val Chapman' message below. Pt verbalized understanding with no further questions or concerns.

## 2019-06-27 PROCEDURE — 88305 TISSUE EXAM BY PATHOLOGIST: CPT | Performed by: INTERNAL MEDICINE

## 2019-06-28 ENCOUNTER — APPOINTMENT (OUTPATIENT)
Dept: LAB | Age: 49
End: 2019-06-28
Attending: INTERNAL MEDICINE
Payer: COMMERCIAL

## 2019-06-28 ENCOUNTER — OFFICE VISIT (OUTPATIENT)
Dept: INTERNAL MEDICINE CLINIC | Facility: CLINIC | Age: 49
End: 2019-06-28
Payer: COMMERCIAL

## 2019-06-28 VITALS
SYSTOLIC BLOOD PRESSURE: 100 MMHG | DIASTOLIC BLOOD PRESSURE: 63 MMHG | BODY MASS INDEX: 26.43 KG/M2 | HEIGHT: 64 IN | HEART RATE: 67 BPM | WEIGHT: 154.81 LBS

## 2019-06-28 DIAGNOSIS — Z00.00 PHYSICAL EXAM: ICD-10-CM

## 2019-06-28 DIAGNOSIS — Z00.00 PHYSICAL EXAM: Primary | ICD-10-CM

## 2019-06-28 PROCEDURE — 93010 ELECTROCARDIOGRAM REPORT: CPT | Performed by: INTERNAL MEDICINE

## 2019-06-28 PROCEDURE — 93005 ELECTROCARDIOGRAM TRACING: CPT

## 2019-06-28 PROCEDURE — 99396 PREV VISIT EST AGE 40-64: CPT | Performed by: INTERNAL MEDICINE

## 2019-07-08 NOTE — PROGRESS NOTES
HPI:    Patient ID: Armida Wilder is a 47year old female. HPI    Physical exam    Right foot toe fracture   5/31  BONES:             Nondisplaced fracture noted involving the diaphysis of the 4th proximal phalanx.   Symptomatically better  /63 Chew 1 tablet by mouth daily.    Disp:  Rfl:      Allergies:No Known Allergies    HISTORY:  Past Medical History:   Diagnosis Date   • Essential hypertension    • Hemorrhoid 1999    hemorrhoidectomy   • Lipid screening 4/18/2014    per NG      Past Surgical or tenderness. Lymphadenopathy:     She has no cervical adenopathy. Neurological: She is alert. Skin: No rash noted. She is not diaphoretic. No erythema. Nursing note and vitals reviewed.     Component      Latest Ref Rng & Units 6/21/2019 6/8/2019 0.8 - 1.7 ng/dL  1.0   T3 TOTAL      60 - 181 ng/dL  116   Vitamin D, 25OH, Total      30.0 - 100.0 ng/mL 39.2    FERRITIN      18.0 - 340.0 ng/mL 124.0    Interpretation      --------------------------  Sinus Rhythm    WITHIN NORMAL LIMITS  No previous EC

## 2019-08-06 ENCOUNTER — HOSPITAL ENCOUNTER (OUTPATIENT)
Dept: MAMMOGRAPHY | Age: 49
Discharge: HOME | End: 2019-08-06
Attending: INTERNAL MEDICINE
Payer: COMMERCIAL

## 2019-08-06 DIAGNOSIS — Z12.31 VISIT FOR SCREENING MAMMOGRAM: ICD-10-CM

## 2019-08-06 PROCEDURE — 77067 SCR MAMMO BI INCL CAD: CPT | Performed by: INTERNAL MEDICINE

## 2019-08-06 PROCEDURE — 77063 BREAST TOMOSYNTHESIS BI: CPT | Performed by: INTERNAL MEDICINE

## 2019-11-11 ENCOUNTER — OFFICE VISIT (OUTPATIENT)
Dept: INTERNAL MEDICINE CLINIC | Facility: CLINIC | Age: 49
End: 2019-11-11
Payer: COMMERCIAL

## 2019-11-11 ENCOUNTER — NURSE TRIAGE (OUTPATIENT)
Dept: OTHER | Age: 49
End: 2019-11-11

## 2019-11-11 VITALS
BODY MASS INDEX: 27.14 KG/M2 | WEIGHT: 159 LBS | RESPIRATION RATE: 16 BRPM | HEIGHT: 64 IN | HEART RATE: 75 BPM | DIASTOLIC BLOOD PRESSURE: 76 MMHG | SYSTOLIC BLOOD PRESSURE: 116 MMHG

## 2019-11-11 DIAGNOSIS — M41.30 THORACOGENIC SCOLIOSIS, UNSPECIFIED SPINAL REGION: ICD-10-CM

## 2019-11-11 DIAGNOSIS — M62.830 SPASM OF THORACIC BACK MUSCLE: Primary | ICD-10-CM

## 2019-11-11 PROCEDURE — 99213 OFFICE O/P EST LOW 20 MIN: CPT | Performed by: INTERNAL MEDICINE

## 2019-11-11 RX ORDER — DICLOFENAC SODIUM 75 MG/1
TABLET, DELAYED RELEASE ORAL
Qty: 30 TABLET | Refills: 1 | Status: SHIPPED | OUTPATIENT
Start: 2019-11-11 | End: 2020-07-20

## 2019-11-11 NOTE — PATIENT INSTRUCTIONS
Problem List Items Addressed This Visit        Unprioritized    Spasm of thoracic back muscle - Primary     Sprain of the paravertebral muscles to the right of midline in the thoracic area.   Slight swelling noted in this area most likely secondary to hyper

## 2019-11-11 NOTE — ASSESSMENT & PLAN NOTE
Scoliosis of the thoracolumbar spine–S-shaped with slight pelvic tilt and tilt of the shoulder girdle. There seems to be some hypertrophy of the muscles to the right of midline as well as in the upper part of the left of midline.   Into scapula muscles are

## 2019-11-11 NOTE — PROGRESS NOTES
HPI:    Patient ID: Jyoti Najera is a 47year old female. Back Pain   This is a new problem.  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 Range of movement at the shoulder looks normal.       Neurological: She is alert and oriented to person, place, and time. She has normal reflexes. Skin: Skin is warm and dry. Nursing note and vitals reviewed. symptoms worsen or fail to improve, for with Dr Jovan Bills. PT UNDERSTANDS AND AGREES TO FOLLOW DIRECTIONS AND ADVICE    No orders of the defined types were placed in this encounter.       Meds This Visit:  Requested Prescriptions     Signed Prescriptions D

## 2019-11-11 NOTE — TELEPHONE ENCOUNTER
Action Requested: Summary for Provider     []  Critical Lab, Recommendations Needed  [] Need Additional Advice  []   FYI    []   Need Orders  [] Need Medications Sent to Pharmacy  []  Other     SUMMARY: Pt asking if doctor would prescribe muscle relaxant f

## 2019-11-11 NOTE — ASSESSMENT & PLAN NOTE
Sprain of the paravertebral muscles to the right of midline in the thoracic area. Slight swelling noted in this area most likely secondary to hypertrophy from scoliosis. Patient is advised limitations given structural deformity in the spine.   This is a c

## 2019-11-12 ENCOUNTER — OFFICE VISIT (OUTPATIENT)
Dept: PHYSICAL THERAPY | Facility: HOSPITAL | Age: 49
End: 2019-11-12
Attending: INTERNAL MEDICINE
Payer: COMMERCIAL

## 2019-11-12 DIAGNOSIS — M41.30 THORACOGENIC SCOLIOSIS, UNSPECIFIED SPINAL REGION: ICD-10-CM

## 2019-11-12 DIAGNOSIS — M62.830 SPASM OF THORACIC BACK MUSCLE: ICD-10-CM

## 2019-11-12 PROCEDURE — 97161 PT EVAL LOW COMPLEX 20 MIN: CPT

## 2019-11-12 PROCEDURE — 97110 THERAPEUTIC EXERCISES: CPT

## 2019-11-12 NOTE — PROGRESS NOTES
SPINE EVALUATION:   Referring Physician: Patrick Zhou MD    Date of Onset: two weeks ago Date of Service: 11/12/19   Diagnosis: Spasm of thoracic back muscle (M62.830)  Thoracogenic scoliosis, unspecified spinal region (M41.30)    Precautions: patient Past medical history was reviewed with Darrin Guido. Had screening in school, by PT about 8 years ago, and had xray and found scoliosis. Went for PT for awhile but had no symptoms at the time.   Also notes fracture of right clavicle when in jr high, and previous ROM: distance in inches for fingertips to floor for flexion and sidebend, degrees for rotation and extension    Trunk         Pain (+/-)   Flexion 3\"                     -   Extension WNL -   R Sidebend 17.5 -   L Sidebend 18. 1/4\" -   R Rotation 25 deg · Patient will demonstrate improved FOTO score by 15 or more points to allow for improved ADL tolerance and prevent recurrence of severe symptoms.      · Pt will improve postural strength (mid/low trap) to 4+/5 to promote improved upright posturing and decr

## 2019-11-18 ENCOUNTER — OFFICE VISIT (OUTPATIENT)
Dept: PHYSICAL THERAPY | Facility: HOSPITAL | Age: 49
End: 2019-11-18
Attending: INTERNAL MEDICINE
Payer: COMMERCIAL

## 2019-11-18 DIAGNOSIS — M41.30 THORACOGENIC SCOLIOSIS, UNSPECIFIED SPINAL REGION: ICD-10-CM

## 2019-11-18 DIAGNOSIS — M62.830 SPASM OF THORACIC BACK MUSCLE: ICD-10-CM

## 2019-11-18 PROCEDURE — 97140 MANUAL THERAPY 1/> REGIONS: CPT

## 2019-11-18 PROCEDURE — 97110 THERAPEUTIC EXERCISES: CPT

## 2019-11-18 NOTE — PROGRESS NOTES
Diagnosis: Spasm of thoracic back muscle (M62.830)  Thoracogenic scoliosis, unspecified spinal region (M41.30)    Precautions: previous hx of scoliosis.  R clavicle Fx,HTN  Insurance Type (# Auth): [employer] PPO (8-12 per POC)   Next MD appt: none scheduled    S training, manual therapy, and proprioception training. Charges:  TherEx2, man mob 1        Total Timed Treatment: 43 min   Total Treatment time: 45 min

## 2019-11-20 ENCOUNTER — OFFICE VISIT (OUTPATIENT)
Dept: PHYSICAL THERAPY | Facility: HOSPITAL | Age: 49
End: 2019-11-20
Attending: INTERNAL MEDICINE
Payer: COMMERCIAL

## 2019-11-20 PROCEDURE — 97140 MANUAL THERAPY 1/> REGIONS: CPT

## 2019-11-20 PROCEDURE — 97110 THERAPEUTIC EXERCISES: CPT

## 2019-11-21 ENCOUNTER — TELEPHONE (OUTPATIENT)
Dept: PHYSICAL THERAPY | Facility: HOSPITAL | Age: 49
End: 2019-11-21

## 2019-11-21 NOTE — PROGRESS NOTES
Diagnosis: Spasm of thoracic back muscle (M62.830)  Thoracogenic scoliosis, unspecified spinal region (M41.30)    Precautions: previous hx of scoliosis.  R clavicle Fx,HTN  Insurance Type (# Auth): [employer] PPO (8-12 per POC)   Next MD appt: none scheduled    S (mid/low trap) to 4+/5 to promote improved upright posturing and decreased pain with trunk flexion.   · Pt will be independent and compliant with comprehensive HEP to maintain progress achieved in PT        Education: Transfers    Assessment: Pt reports no

## 2019-11-27 ENCOUNTER — OFFICE VISIT (OUTPATIENT)
Dept: PHYSICAL THERAPY | Facility: HOSPITAL | Age: 49
End: 2019-11-27
Attending: INTERNAL MEDICINE
Payer: COMMERCIAL

## 2019-11-27 PROCEDURE — 97110 THERAPEUTIC EXERCISES: CPT

## 2019-11-27 PROCEDURE — 97140 MANUAL THERAPY 1/> REGIONS: CPT

## 2019-11-27 NOTE — PROGRESS NOTES
Diagnosis: Spasm of thoracic back muscle (M62.830)  Thoracogenic scoliosis, unspecified spinal region (M41.30)    Precautions: previous hx of scoliosis.  R clavicle Fx,HTN  Insurance Type (# Auth): [employer] PPO (8-12 per POC)   Next MD appt: none scheduled    S position  -strumming for thoracic p/s prone bilateral      HEP: Supine thoracic mobilizer   HEP: open books, seated thoracic ext HEP:   Continue same     Goals: to be achieved by 8-12 sessions     · Pt will improve cervical lateral flexion AROM to left by

## 2019-11-29 ENCOUNTER — OFFICE VISIT (OUTPATIENT)
Dept: PHYSICAL THERAPY | Facility: HOSPITAL | Age: 49
End: 2019-11-29
Attending: INTERNAL MEDICINE
Payer: COMMERCIAL

## 2019-11-29 ENCOUNTER — HOSPITAL ENCOUNTER (OUTPATIENT)
Dept: GENERAL RADIOLOGY | Facility: HOSPITAL | Age: 49
Discharge: HOME | End: 2019-11-29
Attending: INTERNAL MEDICINE
Payer: COMMERCIAL

## 2019-11-29 DIAGNOSIS — M62.830 SPASM OF THORACIC BACK MUSCLE: ICD-10-CM

## 2019-11-29 DIAGNOSIS — M41.30 THORACOGENIC SCOLIOSIS, UNSPECIFIED SPINAL REGION: ICD-10-CM

## 2019-11-29 PROCEDURE — 72082 X-RAY EXAM ENTIRE SPI 2/3 VW: CPT | Performed by: INTERNAL MEDICINE

## 2019-11-29 PROCEDURE — 97140 MANUAL THERAPY 1/> REGIONS: CPT

## 2019-11-29 PROCEDURE — 97110 THERAPEUTIC EXERCISES: CPT

## 2019-11-29 NOTE — PROGRESS NOTES
Diagnosis: Spasm of thoracic back muscle (M62.830)  Thoracogenic scoliosis, unspecified spinal region (M41.30)    Precautions: previous hx of scoliosis.  R clavicle Fx,HTN  Insurance Type (# Auth): BC[employer] PPO (8-12 per POC)   Next MD appt: none scheduled    S -Standing scap retractions and depressions with TA activation RTB and 2 x 10   -Thoracic extension over soft bolster supine at approx T3, T5, and T7 level 3 x 5 secs each and at T5 added Extension and Rotation 3 x 5 secs to left and right     Manual:   -Th Assessment: Pt reports still no significant pain since 2 1/2 wks ago. Patient with tendency to overactivate upper traps and levator with exercises and cues needed for relaxation. Good tolerance overall to exercises and stretches.  Added scap strengthening

## 2019-12-18 ENCOUNTER — OFFICE VISIT (OUTPATIENT)
Dept: PHYSICAL THERAPY | Facility: HOSPITAL | Age: 49
End: 2019-12-18
Attending: INTERNAL MEDICINE
Payer: COMMERCIAL

## 2019-12-18 DIAGNOSIS — M62.830 SPASM OF THORACIC BACK MUSCLE: ICD-10-CM

## 2019-12-18 DIAGNOSIS — M41.30 THORACOGENIC SCOLIOSIS, UNSPECIFIED SPINAL REGION: ICD-10-CM

## 2019-12-18 PROCEDURE — 97110 THERAPEUTIC EXERCISES: CPT

## 2019-12-18 NOTE — PROGRESS NOTES
Diagnosis: Spasm of thoracic back muscle (M62.830)  Thoracogenic scoliosis, unspecified spinal region (M41.30)    Precautions: previous hx of scoliosis.  R clavicle Fx,HTN  Insurance Type (# Auth): BC[employer] PPO (8-12 per POC)   Next MD appt: none scheduled    S -LTR 15\" x 5 r/l   -Open books 1# x 10 r/l   -Standing PNF D1 and D2 flex/ext x 10 each   -Wall forearm slides with scap depression x 10     Manual:   -Thoracic PA mobs grade III  -Thoracic harmonics  -FR to thoracic paraspinals  Manual therapy:  -passive Plan: Continue for stretching, strengthening, ROM, postural training, HEP training, manual therapy, and proprioception training, monitor upper trap and levator overactivation with exercises        Charges:  TherEx3       Total Timed Treatment: 43 min   Tota

## 2019-12-20 ENCOUNTER — OFFICE VISIT (OUTPATIENT)
Dept: PHYSICAL THERAPY | Facility: HOSPITAL | Age: 49
End: 2019-12-20
Attending: INTERNAL MEDICINE
Payer: COMMERCIAL

## 2019-12-20 DIAGNOSIS — M62.830 SPASM OF THORACIC BACK MUSCLE: ICD-10-CM

## 2019-12-20 DIAGNOSIS — M41.30 THORACOGENIC SCOLIOSIS, UNSPECIFIED SPINAL REGION: ICD-10-CM

## 2019-12-20 PROCEDURE — 97110 THERAPEUTIC EXERCISES: CPT

## 2019-12-20 NOTE — PROGRESS NOTES
Diagnosis: Spasm of thoracic back muscle (M62.830)  Thoracogenic scoliosis, unspecified spinal region (M41.30)    Precautions: previous hx scoliosis.  R clavicle Fx,HTN  Insurance Type (# Auth): [employer] PPO (8-12 per POC)   Next MD appt: none scheduled    Subj -prone scap retractions with T's and Y's 10 x 5 secs each  -OH pulley 15 secs x 5 each  -standing scap retractions with lat pulls and rows with cues  RTB  2 x 10  -passive stretch for pect minor against vertical 1/2 foam roll  4 x 15 secs bilat  -Thoracic -passive stretches standing to pect minor into scap posterior tilt 3 x 15 secs  -thoracic spine harmonics at p/s prone  -scapular distraction mobilizations left and right scap in sidelying.       HEP: open books, seated thoracic ext HEP:   Continue same HEP

## 2019-12-23 ENCOUNTER — APPOINTMENT (OUTPATIENT)
Dept: PHYSICAL THERAPY | Facility: HOSPITAL | Age: 49
End: 2019-12-23
Attending: INTERNAL MEDICINE
Payer: COMMERCIAL

## 2019-12-26 ENCOUNTER — APPOINTMENT (OUTPATIENT)
Dept: PHYSICAL THERAPY | Facility: HOSPITAL | Age: 49
End: 2019-12-26
Attending: INTERNAL MEDICINE
Payer: COMMERCIAL

## 2020-01-02 ENCOUNTER — OFFICE VISIT (OUTPATIENT)
Dept: PHYSICAL THERAPY | Facility: HOSPITAL | Age: 50
End: 2020-01-02
Attending: INTERNAL MEDICINE
Payer: COMMERCIAL

## 2020-01-02 DIAGNOSIS — M62.830 SPASM OF THORACIC BACK MUSCLE: ICD-10-CM

## 2020-01-02 DIAGNOSIS — M41.30 THORACOGENIC SCOLIOSIS, UNSPECIFIED SPINAL REGION: ICD-10-CM

## 2020-01-02 PROCEDURE — 97110 THERAPEUTIC EXERCISES: CPT

## 2020-01-02 NOTE — PROGRESS NOTES
DC SUMMARY    Diagnosis: Spasm of thoracic back muscle (M62.830)  Thoracogenic scoliosis, unspecified spinal region (M41.30)    Precautions: previous hx scoliosis.  R clavicle Fx,HTN  Insurance Type (# Auth): [employer] PPO (8-12 per POC)   Next MD appt: none opal Assessment: Pt denies pain since last therapy session and is doing well. Pt improved strength demonstrated at lower trap and mid trap since last assessment. Also improved shoulder and trunk AROM.  Patient with improved posture awareness and excellent resol -Standing scap retractions and depressions with TA activation RTB and 2 x 10   -Thoracic extension over soft bolster supine at approx T3, T5, and T7 level 3 x 5 secs each and at T5 added Extension and Rotation 3 x 5 secs to left and right   TE  -Standing h · Pt will improve cervical retraction ability  to reduce fwd head posture  MET  · Pt will have improved thoracic PA mobility to WNL to improve cervical ROM as well as promote upright posturing and decreased pain with forward trunk flexion.  MET  · Patient w

## 2020-01-06 ENCOUNTER — APPOINTMENT (OUTPATIENT)
Dept: PHYSICAL THERAPY | Facility: HOSPITAL | Age: 50
End: 2020-01-06
Attending: INTERNAL MEDICINE
Payer: COMMERCIAL

## 2020-01-09 ENCOUNTER — APPOINTMENT (OUTPATIENT)
Dept: PHYSICAL THERAPY | Facility: HOSPITAL | Age: 50
End: 2020-01-09
Attending: INTERNAL MEDICINE
Payer: COMMERCIAL

## 2020-04-21 ENCOUNTER — NURSE TRIAGE (OUTPATIENT)
Dept: INTERNAL MEDICINE CLINIC | Facility: CLINIC | Age: 50
End: 2020-04-21

## 2020-04-21 ENCOUNTER — VIRTUAL PHONE E/M (OUTPATIENT)
Dept: INTERNAL MEDICINE CLINIC | Facility: CLINIC | Age: 50
End: 2020-04-21
Payer: COMMERCIAL

## 2020-04-21 VITALS — SYSTOLIC BLOOD PRESSURE: 126 MMHG | DIASTOLIC BLOOD PRESSURE: 78 MMHG

## 2020-04-21 DIAGNOSIS — N95.1 HOT FLUSHES, PERIMENOPAUSAL: ICD-10-CM

## 2020-04-21 DIAGNOSIS — G44.209 MUSCLE TENSION HEADACHE: Primary | ICD-10-CM

## 2020-04-21 PROCEDURE — 99214 OFFICE O/P EST MOD 30 MIN: CPT | Performed by: INTERNAL MEDICINE

## 2020-04-21 RX ORDER — TIZANIDINE 2 MG/1
2 TABLET ORAL NIGHTLY
Qty: 30 TABLET | Refills: 0 | Status: SHIPPED | OUTPATIENT
Start: 2020-04-21 | End: 2020-06-29 | Stop reason: ALTCHOICE

## 2020-04-21 NOTE — TELEPHONE ENCOUNTER
Advised patient of Dr. Meño Garcia note. Patient verbalized understanding. Called call center and telephone visit scheduled for today at 9:30am. Patient agreed and will have her phone near her and will pickup any restricted or unknown calls.

## 2020-04-21 NOTE — TELEPHONE ENCOUNTER
Action Requested: Summary for Provider     []  Critical Lab, Recommendations Needed  [x] Need Additional Advice  []   FYI    []   Need Orders  [] Need Medications Sent to Pharmacy  []  Other     SUMMARY: Patient reports having headaches.      Spoke with the

## 2020-04-21 NOTE — ASSESSMENT & PLAN NOTE
Patient with a history of neck pain for the past 2 to 3 weeks and gradually improved with Tylenol. Currently with headache in the frontal area–moderate about 3-4 out of a 10 without any sinus issues, congestion, nausea, vomiting.   She does wear eyeglasses

## 2020-04-21 NOTE — ASSESSMENT & PLAN NOTE
Patient has started having irregular menstrual cycles, intervals between menstrual periods have increased. She has had some disrupted sleep cycles–wakes up a few times at night but is able to go back to sleep.   She has noticed some hot flashes with some s

## 2020-04-21 NOTE — PROGRESS NOTES
HPI:    Patient ID: Sarah Walsh is a 45year old female. Virtual/Telephone Check-In    Jose Solis verbally consents to a Virtual/Telephone Check-In service on 04/21/20.   Patient has been referred to the [facility] website at [email or web address]. Negative for hearing loss, sinus pressure and sore throat. Eyes: Negative. Negative for blurred vision. Respiratory: Negative. Cardiovascular: Negative. Gastrointestinal: Negative. Negative for abdominal pain and anorexia.    Igor Live alert and oriented to person, place, and time. Skin: Skin is warm and dry. No rash noted. Psychiatric: She has a normal mood and affect. Her behavior is normal. Judgment and thought content normal.   Vitals reviewed.              ASSESSMENT/PLAN:     Pr times at night but is able to go back to sleep. She has noticed some hot flashes with some sweats off and on. This have not been disruptive as yet. Explained perimenopausal hot flushes.   Advised to drink plenty of fluids try some rest and meditation as

## 2020-06-29 ENCOUNTER — OFFICE VISIT (OUTPATIENT)
Dept: INTERNAL MEDICINE CLINIC | Facility: CLINIC | Age: 50
End: 2020-06-29
Payer: COMMERCIAL

## 2020-06-29 VITALS
HEART RATE: 80 BPM | BODY MASS INDEX: 27.66 KG/M2 | SYSTOLIC BLOOD PRESSURE: 95 MMHG | HEIGHT: 64 IN | WEIGHT: 162 LBS | DIASTOLIC BLOOD PRESSURE: 64 MMHG

## 2020-06-29 DIAGNOSIS — Z00.00 ROUTINE PHYSICAL EXAMINATION: Primary | ICD-10-CM

## 2020-06-29 DIAGNOSIS — Z12.31 VISIT FOR SCREENING MAMMOGRAM: ICD-10-CM

## 2020-06-29 PROCEDURE — 3078F DIAST BP <80 MM HG: CPT | Performed by: INTERNAL MEDICINE

## 2020-06-29 PROCEDURE — 3008F BODY MASS INDEX DOCD: CPT | Performed by: INTERNAL MEDICINE

## 2020-06-29 PROCEDURE — 3074F SYST BP LT 130 MM HG: CPT | Performed by: INTERNAL MEDICINE

## 2020-06-29 PROCEDURE — 99396 PREV VISIT EST AGE 40-64: CPT | Performed by: INTERNAL MEDICINE

## 2020-06-29 RX ORDER — MELATONIN
1000 DAILY
COMMUNITY

## 2020-06-29 RX ORDER — MULTIVIT WITH MINERALS/LUTEIN
1000 TABLET ORAL DAILY
COMMUNITY

## 2020-06-29 NOTE — PATIENT INSTRUCTIONS
Problem List Items Addressed This Visit        Unprioritized    Routine physical examination - Primary     Normal exam.  Labs as ordered. Skin check normal.  No significant abnormal nevi.   Breast exam completed–no palpable abnormalities, discharge from th

## 2020-06-29 NOTE — PROGRESS NOTES
REASON FOR VISIT:    Jose M Alicea is a 45year old female who presents for an [address].     Immunization History   Administered Date(s) Administered   • TDAP 2013     OB History     T0    L0    SAB0  TAB0  Ectopic0  Mu Gonorrhea Screening if high risk No results found for: GONOCOCCUS    HIV Screening For all adults age 22-65, older adults at increased risk No results found for: HIV    Syphilis Screening Screen if pregnant or high risk No results found for: RPR    Hepat Chew 1 tablet by mouth daily.           MEDICAL INFORMATION:   Past Medical History:   Diagnosis Date   • Essential hypertension    • Hemorrhoid 1999    hemorrhoidectomy   • Lipid screening 4/18/2014    per NG      Past Surgical History:   Procedure Lateral and throat are clear  EYES:PERRLA, EOMI, normal optic disk, conjunctiva are clear  NECK: supple, no adenopathy, no bruits  CHEST: no chest tenderness  BREAST: no dominant or suspicious mass  LUNGS: clear to auscultation  CARDIO: RRR without murmur  GI: goo Future  -     ASSAY, THYROID STIM HORMONE; Future  -     URINALYSIS, ROUTINE; Future  -     VITAMIN B12; Future    Visit for screening mammogram  -     [facility] YE 2D+3D SCREENING BILAT (CPT=77067/30445);  Future       The patient indicates understanding of th

## 2020-07-20 ENCOUNTER — OFFICE VISIT (OUTPATIENT)
Dept: OBGYN CLINIC | Facility: CLINIC | Age: 50
End: 2020-07-20
Payer: COMMERCIAL

## 2020-07-20 VITALS
BODY MASS INDEX: 28.53 KG/M2 | DIASTOLIC BLOOD PRESSURE: 65 MMHG | SYSTOLIC BLOOD PRESSURE: 101 MMHG | HEART RATE: 78 BPM | HEIGHT: 63.5 IN | WEIGHT: 163 LBS

## 2020-07-20 DIAGNOSIS — Z01.419 ENCOUNTER FOR GYNECOLOGICAL EXAMINATION WITHOUT ABNORMAL FINDING: Primary | ICD-10-CM

## 2020-07-20 DIAGNOSIS — Z12.31 VISIT FOR SCREENING MAMMOGRAM: ICD-10-CM

## 2020-07-20 PROCEDURE — 3078F DIAST BP <80 MM HG: CPT | Performed by: OBSTETRICS & GYNECOLOGY

## 2020-07-20 PROCEDURE — 3008F BODY MASS INDEX DOCD: CPT | Performed by: OBSTETRICS & GYNECOLOGY

## 2020-07-20 PROCEDURE — 99396 PREV VISIT EST AGE 40-64: CPT | Performed by: OBSTETRICS & GYNECOLOGY

## 2020-07-20 PROCEDURE — 3074F SYST BP LT 130 MM HG: CPT | Performed by: OBSTETRICS & GYNECOLOGY

## 2020-07-20 NOTE — PROGRESS NOTES
Kandy Gomez is a 45year old female  Patient's last menstrual period was 2020. who presents for Patient presents with:  Gyn Exam: ANNUAL EXAM   Her cycles are regular. She has no complaints.      OBSTETRICS HISTORY:  OB History    Not on file        Attends meetings of clubs or organizations: Not on file        Relationship status: Not on file      Intimate partner violence:        Fear of current or ex partner: Not on file        Emotionally abused: Not on file        Physically ab Denies fatigue, night sweats, hot flashes  Eyes:  denies blurred or double vision  Cardiovascular:  denies chest pain or palpitations  Respiratory:  denies shortness of breath  Gastrointestinal:  denies heartburn, abdominal pain, diarrhea or constipation tenderness  Perineum: normal  Rectovaginal: no masses, normal tone  Anus: no thrombosed or ulcerated hemorroids    Assessment & Plan:   ASCCP guidelines discussed,cotest done,mammogram ordered,rtc 1 year for annual exam  Encounter for gynecological examina

## 2020-07-21 LAB — HPV I/H RISK 1 DNA SPEC QL NAA+PROBE: NEGATIVE

## 2020-07-22 LAB — LAST PAP RESULT: NORMAL

## 2020-08-11 ENCOUNTER — HOSPITAL ENCOUNTER (OUTPATIENT)
Dept: MAMMOGRAPHY | Age: 50
Discharge: HOME | End: 2020-08-11
Attending: INTERNAL MEDICINE
Payer: COMMERCIAL

## 2020-08-11 DIAGNOSIS — Z12.31 VISIT FOR SCREENING MAMMOGRAM: ICD-10-CM

## 2020-08-11 PROCEDURE — 77067 SCR MAMMO BI INCL CAD: CPT | Performed by: INTERNAL MEDICINE

## 2020-08-11 PROCEDURE — 77063 BREAST TOMOSYNTHESIS BI: CPT | Performed by: INTERNAL MEDICINE

## 2020-08-24 ENCOUNTER — NURSE TRIAGE (OUTPATIENT)
Dept: INTERNAL MEDICINE CLINIC | Facility: CLINIC | Age: 50
End: 2020-08-24

## 2020-08-24 NOTE — TELEPHONE ENCOUNTER
Action Requested: Summary for Provider     []  Critical Lab, Recommendations Needed  [] Need Additional Advice  []   FYI    []   Need Orders  [] Need Medications Sent to Pharmacy  []  Other     SUMMARY: Per protocol disposition advised visit and pt schedul

## 2020-08-25 PROBLEM — F41.9 ANXIETY: Status: ACTIVE | Noted: 2020-08-25

## 2020-08-25 NOTE — PROGRESS NOTES
HPI:    Patient ID: Rema Vela is a 45year old female.   Telehealth outside of [facility] in [address] Verbal Consent   I conducted a telehealth visit with Rema Vela today, 08/25/20, which was completed using two-way, real-time interactive audio been having moments of anxiety as the day to return closer and closer. States just feels overwhelmed and would like meds). Associated symptoms include fatigue. Pertinent negatives include no anorexia, chest pain, congestion, nausea or weakness.  Associated mouth daily as needed. 15 tablet 0   • Vitamin B-12 1000 MCG Oral Tab Take 1,000 mcg by mouth daily. • Ascorbic Acid (VITAMIN C) 1000 MG Oral Tab Take 1,000 mg by mouth daily. • BIOTIN OR Take by mouth.      • Ergocalciferol (VITAMIN D OR) Take 1 ta especially with regards to airborne diseases and transmission. She understands the situation better at this time. I have given her some Xanax at this time for occasional use if needed.   She will give me a call if she feels she cannot cope with return to

## 2020-09-08 NOTE — PATIENT INSTRUCTIONS
Problem List Items Addressed This Visit        Unprioritized    Anxiety - Primary     Anxiety due to fear of the unknown with return to work. Have addressed specific needs at work in order for patient to cope with her return.   She was able to understand t

## 2020-09-08 NOTE — ASSESSMENT & PLAN NOTE
Anxiety due to fear of the unknown with return to work. Have addressed specific needs at work in order for patient to cope with her return. She was able to understand the need to use a mask.   She was also able to understand airborne transmission and need

## 2021-01-18 ENCOUNTER — TELEPHONE (OUTPATIENT)
Dept: INTERNAL MEDICINE CLINIC | Facility: CLINIC | Age: 51
End: 2021-01-18

## 2021-01-18 DIAGNOSIS — Z11.52 ENCOUNTER FOR SCREENING FOR COVID-19: Primary | ICD-10-CM

## 2021-01-18 NOTE — TELEPHONE ENCOUNTER
Patient states she was with a friend on 1/12/21 with friend who tested positive. Both patient and friend were wearing masks, and keeping distance, was only with friend for about 20 minutes. Patient asymptomatic.     Patient informed she doesn't meet c

## 2021-01-19 ENCOUNTER — LAB ENCOUNTER (OUTPATIENT)
Dept: LAB | Facility: HOSPITAL | Age: 51
End: 2021-01-19
Attending: INTERNAL MEDICINE
Payer: COMMERCIAL

## 2021-01-19 DIAGNOSIS — Z11.52 ENCOUNTER FOR SCREENING FOR COVID-19: ICD-10-CM

## 2021-01-19 NOTE — TELEPHONE ENCOUNTER
Patient called, and states her employer needs patient to get covid tested. See message from yesterday. Patient is asymptomatic. [facility] order pended.

## 2021-01-19 NOTE — TELEPHONE ENCOUNTER
Verified name and . She was informed that Dr. Maria Isabel Shafer has ordered the COVID-19 test.  Provided number for central scheduling [phone]. Patient was transferred to central scheduling.

## 2021-01-22 LAB — SARS-COV-2 BY PCR: NOT DETECTED

## 2021-03-15 ENCOUNTER — TELEPHONE (OUTPATIENT)
Dept: INTERNAL MEDICINE CLINIC | Facility: CLINIC | Age: 51
End: 2021-03-15

## 2021-03-16 NOTE — TELEPHONE ENCOUNTER
Patient contacted. Her throat has a \"stink bug\" in mouth; spot or something stuck, (sounds like tonsil stones) Has been doing salt water gargles. But hasn't been able to remove it.      Advised to continue salt water gargles, or can dilute apple cider

## 2021-07-01 ENCOUNTER — OFFICE VISIT (OUTPATIENT)
Dept: INTERNAL MEDICINE CLINIC | Facility: CLINIC | Age: 51
End: 2021-07-01
Payer: COMMERCIAL

## 2021-07-01 VITALS
BODY MASS INDEX: 28.7 KG/M2 | HEART RATE: 78 BPM | SYSTOLIC BLOOD PRESSURE: 105 MMHG | WEIGHT: 164 LBS | HEIGHT: 63.5 IN | DIASTOLIC BLOOD PRESSURE: 69 MMHG

## 2021-07-01 DIAGNOSIS — Z12.31 VISIT FOR SCREENING MAMMOGRAM: ICD-10-CM

## 2021-07-01 DIAGNOSIS — Z00.00 ROUTINE PHYSICAL EXAMINATION: Primary | ICD-10-CM

## 2021-07-01 PROCEDURE — 3074F SYST BP LT 130 MM HG: CPT | Performed by: INTERNAL MEDICINE

## 2021-07-01 PROCEDURE — 3078F DIAST BP <80 MM HG: CPT | Performed by: INTERNAL MEDICINE

## 2021-07-01 PROCEDURE — 3008F BODY MASS INDEX DOCD: CPT | Performed by: INTERNAL MEDICINE

## 2021-07-01 PROCEDURE — 99396 PREV VISIT EST AGE 40-64: CPT | Performed by: INTERNAL MEDICINE

## 2021-07-01 NOTE — PROGRESS NOTES
HPI:   Rema Vela is a 45year old female who presents for an Annual Health Visit.        Allergies:   No Known Allergies    CURRENT MEDICATIONS   Current Outpatient Medications   Medication Sig Dispense Refill   • Vitamin B-12 1000 MCG Oral Tab Allergic/Immunologic: Negative. Neurological: Negative. Hematological: Negative. Psychiatric/Behavioral: Negative.           EXAM:   /69 (BP Location: Right arm, Patient Position: Sitting, Cuff Size: large)   Pulse 78   Ht 5' 3.5\" (1.613 m normal. There is no distension. Palpations: Abdomen is soft. There is no mass. Tenderness: There is no abdominal tenderness. There is no rebound. Hernia: There is no hernia in the left inguinal area or right inguinal area.    Genitourinary: mammogram  -     [facility] YE 2D+3D SCREENING BILAT (CPT=77067/35671); Future        Patient Instructions     Problem List Items Addressed This Visit        Unprioritized    Routine physical examination - Primary     Normal exam.  Labs as ordered.   Skin check

## 2021-07-01 NOTE — ASSESSMENT & PLAN NOTE
Normal exam.  Labs as ordered. Skin check normal.  No significant abnormal nevi. Breast exam completed–no palpable abnormalities, discharge from the nipples or axillary adenopathy.   Mammogram due on 08/11/2021-orders provided  No cervical or inguinal lym

## 2021-08-12 ENCOUNTER — HOSPITAL ENCOUNTER (OUTPATIENT)
Dept: MAMMOGRAPHY | Facility: HOSPITAL | Age: 51
Discharge: HOME | End: 2021-08-12
Attending: INTERNAL MEDICINE
Payer: COMMERCIAL

## 2021-08-12 DIAGNOSIS — Z12.31 VISIT FOR SCREENING MAMMOGRAM: ICD-10-CM

## 2021-08-12 PROCEDURE — 77063 BREAST TOMOSYNTHESIS BI: CPT | Performed by: INTERNAL MEDICINE

## 2021-08-12 PROCEDURE — 77067 SCR MAMMO BI INCL CAD: CPT | Performed by: INTERNAL MEDICINE

## 2021-08-17 ENCOUNTER — OFFICE VISIT (OUTPATIENT)
Dept: OBGYN CLINIC | Facility: CLINIC | Age: 51
End: 2021-08-17
Payer: COMMERCIAL

## 2021-08-17 VITALS
SYSTOLIC BLOOD PRESSURE: 112 MMHG | BODY MASS INDEX: 29.04 KG/M2 | HEIGHT: 63.7 IN | WEIGHT: 168 LBS | HEART RATE: 83 BPM | DIASTOLIC BLOOD PRESSURE: 74 MMHG

## 2021-08-17 DIAGNOSIS — Z80.3 FAMILY HISTORY OF BREAST CANCER IN FIRST DEGREE RELATIVE: ICD-10-CM

## 2021-08-17 DIAGNOSIS — Z01.419 ENCOUNTER FOR GYNECOLOGICAL EXAMINATION WITHOUT ABNORMAL FINDING: Primary | ICD-10-CM

## 2021-08-17 PROCEDURE — 3074F SYST BP LT 130 MM HG: CPT | Performed by: OBSTETRICS & GYNECOLOGY

## 2021-08-17 PROCEDURE — 3078F DIAST BP <80 MM HG: CPT | Performed by: OBSTETRICS & GYNECOLOGY

## 2021-08-17 PROCEDURE — 3008F BODY MASS INDEX DOCD: CPT | Performed by: OBSTETRICS & GYNECOLOGY

## 2021-08-17 PROCEDURE — 99396 PREV VISIT EST AGE 40-64: CPT | Performed by: OBSTETRICS & GYNECOLOGY

## 2021-08-17 NOTE — PROGRESS NOTES
Gracia Muller is a 45year old female  Patient's last menstrual period was 2021 (approximate). who presents for Patient presents with:  Gyn Exam: ANNUAL EXAM  She has no complaints.   She has 2 paternal aunts and her mother with h/o breast Vitamin B-12 1000 MCG Oral Tab, Take 1,000 mcg by mouth daily. , Disp: , Rfl:   •  Ascorbic Acid (VITAMIN C) 1000 MG Oral Tab, Take 1,000 mg by mouth daily. , Disp: , Rfl:   •  BIOTIN OR, Take by mouth., Disp: , Rfl:   •  Ergocalciferol (VITAMIN D OR), Take to time, place, person and situation.  Appropriate mood and affect    Pelvic Exam:  External Genitalia: normal appearance, hair distribution, and no lesions  Urethral Meatus:  normal in size, location, without lesions and prolapse  Bladder:  No fullness, ma

## 2021-08-19 ENCOUNTER — APPOINTMENT (OUTPATIENT)
Dept: HEMATOLOGY/ONCOLOGY | Facility: HOSPITAL | Age: 51
End: 2021-08-19
Attending: GENETIC COUNSELOR, MS
Payer: COMMERCIAL

## 2021-08-19 ENCOUNTER — GENETICS ENCOUNTER (OUTPATIENT)
Dept: GENETICS | Facility: HOSPITAL | Age: 51
End: 2021-08-19
Attending: GENETIC COUNSELOR, MS
Payer: COMMERCIAL

## 2021-08-19 DIAGNOSIS — Z80.0 FAMILY HISTORY OF PANCREATIC CANCER: ICD-10-CM

## 2021-08-19 DIAGNOSIS — Z80.3 FAMILY HISTORY OF BREAST CANCER: Primary | ICD-10-CM

## 2021-08-19 PROCEDURE — 96040 HC GENETIC COUNSELING EA 30 MIN: CPT | Performed by: GENETIC COUNSELOR, MS

## 2021-08-19 NOTE — PROGRESS NOTES
Reason for visit: Ms. Jose Eduardo Bowen is a 50-year-old woman referred for genetic counseling based upon her family history of pancreatic cancer as well as other family members with a history of other cancers.   She was seen today to discuss the option of genetic te current breast complaints, abnormal mammograms or breast biopsies. Summary:   We discussed hereditary cancer with Ms. Solis because of her maternal family history of pancreatic cancer as well as the multiple relatives with stomach/GI cancer.   Most canc increased risk for pancreatic cancer - but mutations in these other genes are often associated with other cancers (colon, uterine, ovarian) or other clinical features, none of which are present in her family history.   We discussed the benefits and limitati insurers, long term healthcare or disability, which are not covered by statutes yet. She plans to discuss this with her mother and determine if she is willing to pursue genetic testing.   I encouraged her to have her contact me if she needs my assistance o

## 2021-10-05 ENCOUNTER — NURSE TRIAGE (OUTPATIENT)
Dept: INTERNAL MEDICINE CLINIC | Facility: CLINIC | Age: 51
End: 2021-10-05

## 2021-10-05 ENCOUNTER — LAB ENCOUNTER (OUTPATIENT)
Dept: LAB | Age: 51
End: 2021-10-05
Attending: INTERNAL MEDICINE
Payer: COMMERCIAL

## 2021-10-05 ENCOUNTER — OFFICE VISIT (OUTPATIENT)
Dept: INTERNAL MEDICINE CLINIC | Facility: CLINIC | Age: 51
End: 2021-10-05
Payer: COMMERCIAL

## 2021-10-05 VITALS
TEMPERATURE: 99 F | SYSTOLIC BLOOD PRESSURE: 101 MMHG | OXYGEN SATURATION: 99 % | WEIGHT: 167 LBS | DIASTOLIC BLOOD PRESSURE: 66 MMHG | BODY MASS INDEX: 29 KG/M2 | HEART RATE: 67 BPM

## 2021-10-05 DIAGNOSIS — Z00.00 ROUTINE PHYSICAL EXAMINATION: ICD-10-CM

## 2021-10-05 DIAGNOSIS — M25.475 BILATERAL SWELLING OF FEET AND ANKLES: Primary | ICD-10-CM

## 2021-10-05 DIAGNOSIS — M25.472 BILATERAL SWELLING OF FEET AND ANKLES: Primary | ICD-10-CM

## 2021-10-05 DIAGNOSIS — M25.471 BILATERAL SWELLING OF FEET AND ANKLES: Primary | ICD-10-CM

## 2021-10-05 DIAGNOSIS — M25.474 BILATERAL SWELLING OF FEET AND ANKLES: Primary | ICD-10-CM

## 2021-10-05 PROCEDURE — 99212 OFFICE O/P EST SF 10 MIN: CPT | Performed by: NURSE PRACTITIONER

## 2021-10-05 PROCEDURE — 80053 COMPREHEN METABOLIC PANEL: CPT

## 2021-10-05 PROCEDURE — 81003 URINALYSIS AUTO W/O SCOPE: CPT

## 2021-10-05 PROCEDURE — 3078F DIAST BP <80 MM HG: CPT | Performed by: NURSE PRACTITIONER

## 2021-10-05 PROCEDURE — 84443 ASSAY THYROID STIM HORMONE: CPT

## 2021-10-05 PROCEDURE — 80061 LIPID PANEL: CPT

## 2021-10-05 PROCEDURE — 82607 VITAMIN B-12: CPT

## 2021-10-05 PROCEDURE — 82306 VITAMIN D 25 HYDROXY: CPT

## 2021-10-05 PROCEDURE — 36415 COLL VENOUS BLD VENIPUNCTURE: CPT

## 2021-10-05 PROCEDURE — 85025 COMPLETE CBC W/AUTO DIFF WBC: CPT

## 2021-10-05 PROCEDURE — 3074F SYST BP LT 130 MM HG: CPT | Performed by: NURSE PRACTITIONER

## 2021-10-05 NOTE — TELEPHONE ENCOUNTER
Action Requested: Summary for Provider     []  Critical Lab, Recommendations Needed  [] Need Additional Advice  []   FYI    []   Need Orders  [] Need Medications Sent to Pharmacy  []  Other     SUMMARY: In office appt made for today at 4 p.m. with VANGIE Ames

## 2021-10-05 NOTE — PROGRESS NOTES
Brad Nurse is a 45year old female. Patient presents with:  Swelling: ankles-bilateral- intermittent    HPI:   Here for ankle and foot swelling. Pt states she has been swelling since August. Pt denies any sob or chest pain.  Pt denies any pain in her Negative for cough, shortness of breath and wheezing. Cardiovascular: Negative for chest pain, palpitations and leg swelling. Ankle swelling      Gastrointestinal: Negative. Negative for constipation, diarrhea, nausea and vomiting.    Surinder Kuo feet and ankles  dw patient to follow a low salt diet   Dw patient to wear compression stockings during the day and remove at night time   dw patient to elevated her legs when possible     The patient indicates understanding of these issues and agrees to t

## 2021-11-24 ENCOUNTER — IMMUNIZATION (OUTPATIENT)
Dept: LAB | Facility: HOSPITAL | Age: 51
End: 2021-11-24
Attending: EMERGENCY MEDICINE
Payer: COMMERCIAL

## 2021-11-24 DIAGNOSIS — Z23 NEED FOR VACCINATION: Primary | ICD-10-CM

## 2021-11-24 PROCEDURE — 0004A SARSCOV2 VAC 30MCG/0.3ML IM: CPT

## 2021-11-26 ENCOUNTER — OFFICE VISIT (OUTPATIENT)
Dept: INTERNAL MEDICINE CLINIC | Facility: CLINIC | Age: 51
End: 2021-11-26
Payer: COMMERCIAL

## 2021-11-26 VITALS
DIASTOLIC BLOOD PRESSURE: 60 MMHG | BODY MASS INDEX: 28.35 KG/M2 | RESPIRATION RATE: 16 BRPM | WEIGHT: 162 LBS | TEMPERATURE: 98 F | OXYGEN SATURATION: 98 % | HEART RATE: 67 BPM | HEIGHT: 63.5 IN | SYSTOLIC BLOOD PRESSURE: 108 MMHG

## 2021-11-26 DIAGNOSIS — M79.622 AXILLARY PAIN, LEFT: Primary | ICD-10-CM

## 2021-11-26 PROCEDURE — 3078F DIAST BP <80 MM HG: CPT | Performed by: INTERNAL MEDICINE

## 2021-11-26 PROCEDURE — 3008F BODY MASS INDEX DOCD: CPT | Performed by: INTERNAL MEDICINE

## 2021-11-26 PROCEDURE — 99213 OFFICE O/P EST LOW 20 MIN: CPT | Performed by: INTERNAL MEDICINE

## 2021-11-26 PROCEDURE — 3074F SYST BP LT 130 MM HG: CPT | Performed by: INTERNAL MEDICINE

## 2021-11-26 NOTE — PROGRESS NOTES
Subjective:     Patient ID: Fabian Longoria is a 45year old female.     HPI  Patient comes in today with complaint of left arm pain area pain and fullness she got her Covid vaccine the 24th on Wednesday is when the symptoms started the day after the shou Never Smoker      Smokeless tobacco: Never Used    Vaping Use      Vaping Use: Never used    Alcohol use: No      Alcohol/week: 0.0 standard drinks    Drug use: No       Objective:   Physical Exam  Vitals and nursing note reviewed.    Constitutional:

## 2021-11-27 ENCOUNTER — TELEPHONE (OUTPATIENT)
Dept: INTERNAL MEDICINE CLINIC | Facility: CLINIC | Age: 51
End: 2021-11-27

## 2021-11-27 NOTE — TELEPHONE ENCOUNTER
Patient seen in the office yesterday due to a swollen lymph node in the left armpit after her 3rd covid vaccine. Advised this is common and should go away on it's own. Advised ibuprofen (follow instructions on container), warm moist heat. Nothing further.

## 2021-12-18 ENCOUNTER — HOSPITAL ENCOUNTER (OUTPATIENT)
Age: 51
Discharge: HOME | End: 2021-12-18
Payer: COMMERCIAL

## 2021-12-18 VITALS
HEART RATE: 76 BPM | OXYGEN SATURATION: 100 % | DIASTOLIC BLOOD PRESSURE: 72 MMHG | TEMPERATURE: 99 F | RESPIRATION RATE: 18 BRPM | SYSTOLIC BLOOD PRESSURE: 117 MMHG

## 2021-12-18 DIAGNOSIS — Z20.822 ENCOUNTER FOR LABORATORY TESTING FOR COVID-19 VIRUS: Primary | ICD-10-CM

## 2021-12-18 PROCEDURE — 99212 OFFICE O/P EST SF 10 MIN: CPT | Performed by: NURSE PRACTITIONER

## 2021-12-18 PROCEDURE — U0002 COVID-19 LAB TEST NON-CDC: HCPCS | Performed by: NURSE PRACTITIONER

## 2021-12-18 NOTE — ED PROVIDER NOTES
Patient presents with:  Testing      HPI:     Alexsandra Ordonez is a 45year old female who for a Covid test.  She states she had a possible exposure at work. She denies any symptoms or complaints. She is fully vaccinated.   [facility] asessment screens r Outdoor occupation: Not Asked        Pt has a pacemaker: Not Asked        Pt has a defibrillator: Not Asked        Breast feeding: Not Asked        Reaction to local anesthetic: No    Social History Narrative      Not on file    Social Determinants of Heal

## 2022-02-03 ENCOUNTER — NURSE TRIAGE (OUTPATIENT)
Dept: INTERNAL MEDICINE CLINIC | Facility: CLINIC | Age: 52
End: 2022-02-03

## 2022-04-04 ENCOUNTER — HOSPITAL ENCOUNTER (OUTPATIENT)
Dept: GENERAL RADIOLOGY | Facility: HOSPITAL | Age: 52
Discharge: HOME | End: 2022-04-04
Attending: NURSE PRACTITIONER
Payer: COMMERCIAL

## 2022-04-04 ENCOUNTER — LAB ENCOUNTER (OUTPATIENT)
Dept: LAB | Age: 52
End: 2022-04-04
Attending: NURSE PRACTITIONER
Payer: COMMERCIAL

## 2022-04-04 ENCOUNTER — OFFICE VISIT (OUTPATIENT)
Dept: INTERNAL MEDICINE CLINIC | Facility: CLINIC | Age: 52
End: 2022-04-04
Payer: COMMERCIAL

## 2022-04-04 VITALS
HEART RATE: 80 BPM | SYSTOLIC BLOOD PRESSURE: 114 MMHG | BODY MASS INDEX: 28.14 KG/M2 | DIASTOLIC BLOOD PRESSURE: 70 MMHG | WEIGHT: 160.81 LBS | HEIGHT: 63.5 IN

## 2022-04-04 DIAGNOSIS — R07.89 CHEST TIGHTNESS: ICD-10-CM

## 2022-04-04 DIAGNOSIS — R07.89 CHEST TIGHTNESS: Primary | ICD-10-CM

## 2022-04-04 PROCEDURE — 99213 OFFICE O/P EST LOW 20 MIN: CPT | Performed by: NURSE PRACTITIONER

## 2022-04-04 PROCEDURE — 93010 ELECTROCARDIOGRAM REPORT: CPT | Performed by: NURSE PRACTITIONER

## 2022-04-04 PROCEDURE — 71046 X-RAY EXAM CHEST 2 VIEWS: CPT | Performed by: NURSE PRACTITIONER

## 2022-04-04 PROCEDURE — 3078F DIAST BP <80 MM HG: CPT | Performed by: NURSE PRACTITIONER

## 2022-04-04 PROCEDURE — 3008F BODY MASS INDEX DOCD: CPT | Performed by: NURSE PRACTITIONER

## 2022-04-04 PROCEDURE — 3074F SYST BP LT 130 MM HG: CPT | Performed by: NURSE PRACTITIONER

## 2022-04-04 PROCEDURE — 93005 ELECTROCARDIOGRAM TRACING: CPT

## 2022-07-25 ENCOUNTER — OFFICE VISIT (OUTPATIENT)
Dept: INTERNAL MEDICINE CLINIC | Facility: CLINIC | Age: 52
End: 2022-07-25
Payer: COMMERCIAL

## 2022-07-25 VITALS
SYSTOLIC BLOOD PRESSURE: 100 MMHG | DIASTOLIC BLOOD PRESSURE: 62 MMHG | BODY MASS INDEX: 29.02 KG/M2 | WEIGHT: 165.81 LBS | HEIGHT: 63.5 IN | HEART RATE: 76 BPM | RESPIRATION RATE: 16 BRPM

## 2022-07-25 DIAGNOSIS — Z00.00 PHYSICAL EXAM, ANNUAL: Primary | ICD-10-CM

## 2022-07-25 DIAGNOSIS — Z12.31 ENCOUNTER FOR SCREENING MAMMOGRAM FOR MALIGNANT NEOPLASM OF BREAST: ICD-10-CM

## 2022-07-25 PROCEDURE — 99396 PREV VISIT EST AGE 40-64: CPT | Performed by: INTERNAL MEDICINE

## 2022-07-25 PROCEDURE — 3078F DIAST BP <80 MM HG: CPT | Performed by: INTERNAL MEDICINE

## 2022-07-25 PROCEDURE — 3008F BODY MASS INDEX DOCD: CPT | Performed by: INTERNAL MEDICINE

## 2022-07-25 PROCEDURE — 3074F SYST BP LT 130 MM HG: CPT | Performed by: INTERNAL MEDICINE

## 2022-07-26 ENCOUNTER — LAB ENCOUNTER (OUTPATIENT)
Dept: LAB | Age: 52
End: 2022-07-26
Attending: INTERNAL MEDICINE
Payer: COMMERCIAL

## 2022-07-26 DIAGNOSIS — E03.8 SUBCLINICAL HYPOTHYROIDISM: Primary | ICD-10-CM

## 2022-07-26 DIAGNOSIS — Z00.00 PHYSICAL EXAM, ANNUAL: ICD-10-CM

## 2022-07-26 DIAGNOSIS — E03.8 SUBCLINICAL HYPOTHYROIDISM: ICD-10-CM

## 2022-07-26 LAB
ALBUMIN SERPL-MCNC: 3.5 G/DL (ref 3.4–5)
ALBUMIN/GLOB SERPL: 0.8 {RATIO} (ref 1–2)
ALP LIVER SERPL-CCNC: 114 U/L
ALT SERPL-CCNC: 30 U/L
ANION GAP SERPL CALC-SCNC: 4 MMOL/L (ref 0–18)
AST SERPL-CCNC: 20 U/L (ref 15–37)
BILIRUB SERPL-MCNC: 0.3 MG/DL (ref 0.1–2)
BUN BLD-MCNC: 14 MG/DL (ref 7–18)
BUN/CREAT SERPL: 15.9 (ref 10–20)
CALCIUM BLD-MCNC: 9.3 MG/DL (ref 8.5–10.1)
CHLORIDE SERPL-SCNC: 108 MMOL/L (ref 98–112)
CHOLEST SERPL-MCNC: 187 MG/DL (ref ?–200)
CO2 SERPL-SCNC: 28 MMOL/L (ref 21–32)
CREAT BLD-MCNC: 0.88 MG/DL
DEPRECATED RDW RBC AUTO: 38.2 FL (ref 35.1–46.3)
ERYTHROCYTE [DISTWIDTH] IN BLOOD BY AUTOMATED COUNT: 12.5 % (ref 11–15)
FASTING PATIENT LIPID ANSWER: YES
FASTING STATUS PATIENT QL REPORTED: YES
GLOBULIN PLAS-MCNC: 4.4 G/DL (ref 2.8–4.4)
GLUCOSE BLD-MCNC: 93 MG/DL (ref 70–99)
HCT VFR BLD AUTO: 39.7 %
HDLC SERPL-MCNC: 56 MG/DL (ref 40–59)
HGB BLD-MCNC: 12.6 G/DL
LDLC SERPL CALC-MCNC: 118 MG/DL (ref ?–100)
MCH RBC QN AUTO: 26.3 PG (ref 26–34)
MCHC RBC AUTO-ENTMCNC: 31.7 G/DL (ref 31–37)
MCV RBC AUTO: 82.7 FL
NONHDLC SERPL-MCNC: 131 MG/DL (ref ?–130)
OSMOLALITY SERPL CALC.SUM OF ELEC: 290 MOSM/KG (ref 275–295)
PLATELET # BLD AUTO: 253 10(3)UL (ref 150–450)
POTASSIUM SERPL-SCNC: 4.6 MMOL/L (ref 3.5–5.1)
PROT SERPL-MCNC: 7.9 G/DL (ref 6.4–8.2)
RBC # BLD AUTO: 4.8 X10(6)UL
SODIUM SERPL-SCNC: 140 MMOL/L (ref 136–145)
T3FREE SERPL-MCNC: 2.64 PG/ML (ref 2.4–4.2)
T4 FREE SERPL-MCNC: 0.8 NG/DL (ref 0.8–1.7)
TRIGL SERPL-MCNC: 71 MG/DL (ref 30–149)
TSI SER-ACNC: 0.33 MIU/ML (ref 0.36–3.74)
VLDLC SERPL CALC-MCNC: 12 MG/DL (ref 0–30)
WBC # BLD AUTO: 6.9 X10(3) UL (ref 4–11)

## 2022-07-26 PROCEDURE — 84481 FREE ASSAY (FT-3): CPT

## 2022-07-26 PROCEDURE — 36415 COLL VENOUS BLD VENIPUNCTURE: CPT

## 2022-07-26 PROCEDURE — 84439 ASSAY OF FREE THYROXINE: CPT

## 2022-07-26 PROCEDURE — 84443 ASSAY THYROID STIM HORMONE: CPT

## 2022-07-26 PROCEDURE — 80061 LIPID PANEL: CPT

## 2022-07-26 PROCEDURE — 85027 COMPLETE CBC AUTOMATED: CPT

## 2022-07-26 PROCEDURE — 80053 COMPREHEN METABOLIC PANEL: CPT

## 2022-08-14 ENCOUNTER — IMMUNIZATION (OUTPATIENT)
Dept: LAB | Age: 52
End: 2022-08-14
Attending: EMERGENCY MEDICINE
Payer: COMMERCIAL

## 2022-08-14 DIAGNOSIS — Z23 NEED FOR VACCINATION: Primary | ICD-10-CM

## 2022-08-14 PROCEDURE — 0054A SARSCOV2 VAC 30MCG TRS SUCR: CPT

## 2022-08-19 ENCOUNTER — HOSPITAL ENCOUNTER (OUTPATIENT)
Dept: MAMMOGRAPHY | Facility: HOSPITAL | Age: 52
Discharge: HOME | End: 2022-08-19
Attending: INTERNAL MEDICINE
Payer: COMMERCIAL

## 2022-08-19 DIAGNOSIS — Z12.31 ENCOUNTER FOR SCREENING MAMMOGRAM FOR MALIGNANT NEOPLASM OF BREAST: ICD-10-CM

## 2022-08-19 PROCEDURE — 77063 BREAST TOMOSYNTHESIS BI: CPT | Performed by: INTERNAL MEDICINE

## 2022-08-19 PROCEDURE — 77067 SCR MAMMO BI INCL CAD: CPT | Performed by: INTERNAL MEDICINE

## 2022-09-28 ENCOUNTER — TELEPHONE (OUTPATIENT)
Dept: INTERNAL MEDICINE CLINIC | Facility: CLINIC | Age: 52
End: 2022-09-28

## 2022-09-28 NOTE — TELEPHONE ENCOUNTER
Pt would like to schedule an appointment to see Dr. Nabeel Mann on 10-10-22. Per the patient she is off of work on this date. Per the patient this is to discuss her ankles. Pt is not having any symptoms or pain at this time. There are no available appointments. Can a res 24 be used for the patient.

## 2022-09-29 NOTE — TELEPHONE ENCOUNTER
ususallythe res24 is for acute issues   If shedoesnt have acute issues maybe she can wait for the next avail apt

## 2022-11-08 ENCOUNTER — OFFICE VISIT (OUTPATIENT)
Dept: INTERNAL MEDICINE CLINIC | Facility: CLINIC | Age: 52
End: 2022-11-08
Payer: COMMERCIAL

## 2022-11-08 VITALS
HEIGHT: 63.5 IN | WEIGHT: 165.63 LBS | HEART RATE: 80 BPM | SYSTOLIC BLOOD PRESSURE: 121 MMHG | BODY MASS INDEX: 28.98 KG/M2 | DIASTOLIC BLOOD PRESSURE: 80 MMHG

## 2022-11-08 DIAGNOSIS — M25.472 ANKLE SWELLING, LEFT: ICD-10-CM

## 2022-11-08 DIAGNOSIS — R22.2 LOCALIZED SWELLING OF CHEST WALL: Primary | ICD-10-CM

## 2022-11-08 PROCEDURE — 3008F BODY MASS INDEX DOCD: CPT | Performed by: INTERNAL MEDICINE

## 2022-11-08 PROCEDURE — 3079F DIAST BP 80-89 MM HG: CPT | Performed by: INTERNAL MEDICINE

## 2022-11-08 PROCEDURE — 99213 OFFICE O/P EST LOW 20 MIN: CPT | Performed by: INTERNAL MEDICINE

## 2022-11-08 PROCEDURE — 3074F SYST BP LT 130 MM HG: CPT | Performed by: INTERNAL MEDICINE

## 2022-11-08 NOTE — PATIENT INSTRUCTIONS
Your right sternoclavicular joint is more prominent than your left sternoclavicular joint but is medically insignificant. The fatty tissue around your left ankle is not medically significant either.

## 2022-12-05 ENCOUNTER — NURSE TRIAGE (OUTPATIENT)
Dept: INTERNAL MEDICINE CLINIC | Facility: CLINIC | Age: 52
End: 2022-12-05

## 2022-12-15 ENCOUNTER — NURSE TRIAGE (OUTPATIENT)
Dept: INTERNAL MEDICINE CLINIC | Facility: CLINIC | Age: 52
End: 2022-12-15

## 2022-12-15 ENCOUNTER — TELEMEDICINE (OUTPATIENT)
Dept: INTERNAL MEDICINE CLINIC | Facility: CLINIC | Age: 52
End: 2022-12-15

## 2022-12-15 DIAGNOSIS — M79.644 PAIN OF FINGER OF RIGHT HAND: Primary | ICD-10-CM

## 2022-12-15 PROCEDURE — 99213 OFFICE O/P EST LOW 20 MIN: CPT | Performed by: INTERNAL MEDICINE

## 2022-12-16 ENCOUNTER — HOSPITAL ENCOUNTER (OUTPATIENT)
Dept: GENERAL RADIOLOGY | Facility: HOSPITAL | Age: 52
Discharge: HOME | End: 2022-12-16
Attending: INTERNAL MEDICINE
Payer: COMMERCIAL

## 2022-12-16 DIAGNOSIS — M79.644 PAIN OF FINGER OF RIGHT HAND: ICD-10-CM

## 2022-12-16 PROCEDURE — 73140 X-RAY EXAM OF FINGER(S): CPT | Performed by: INTERNAL MEDICINE

## 2022-12-19 ENCOUNTER — TELEPHONE (OUTPATIENT)
Dept: INTERNAL MEDICINE CLINIC | Facility: CLINIC | Age: 52
End: 2022-12-19

## 2022-12-19 ENCOUNTER — PATIENT MESSAGE (OUTPATIENT)
Dept: INTERNAL MEDICINE CLINIC | Facility: CLINIC | Age: 52
End: 2022-12-19

## 2022-12-19 DIAGNOSIS — M79.644 PAIN IN FINGER OF RIGHT HAND: Primary | ICD-10-CM

## 2022-12-19 NOTE — TELEPHONE ENCOUNTER
Patient called office. Patient's date of birth and full name both confirmed. Asking for help attaching a picture. RN provided brief education, but she still had issues. RN provided [employer] [employer] number and advised her to contact them for assistance. She wrote down number and verbalizes understanding of all information, and agreeable to plan.

## 2022-12-19 NOTE — TELEPHONE ENCOUNTER
Jacqui Carr RN 12/19/2022 1:26 PM CST        ----- Message -----  From: Germán Jimenez  Sent: 12/19/2022 1:04 PM CST  To: Em Rn Triage  Subject: Question regarding XR FINGER(S) (MIN 2 VIEWS*    I received the great news regarding my test results. Finger is still bothering me.  Can you provide me the information to see a specialist?

## 2023-01-05 ENCOUNTER — OFFICE VISIT (OUTPATIENT)
Dept: SURGERY | Facility: CLINIC | Age: 53
End: 2023-01-05
Payer: COMMERCIAL

## 2023-01-05 VITALS — RESPIRATION RATE: 18 BRPM | HEART RATE: 68 BPM | DIASTOLIC BLOOD PRESSURE: 67 MMHG | SYSTOLIC BLOOD PRESSURE: 110 MMHG

## 2023-01-05 DIAGNOSIS — M65.331 TRIGGER MIDDLE FINGER OF RIGHT HAND: Primary | ICD-10-CM

## 2023-01-05 PROCEDURE — 99244 OFF/OP CNSLTJ NEW/EST MOD 40: CPT | Performed by: PLASTIC SURGERY

## 2023-01-05 PROCEDURE — 3078F DIAST BP <80 MM HG: CPT | Performed by: PLASTIC SURGERY

## 2023-01-05 PROCEDURE — 20550 NJX 1 TENDON SHEATH/LIGAMENT: CPT | Performed by: PLASTIC SURGERY

## 2023-01-05 PROCEDURE — 3074F SYST BP LT 130 MM HG: CPT | Performed by: PLASTIC SURGERY

## 2023-01-05 RX ORDER — BETAMETHASONE SODIUM PHOSPHATE AND BETAMETHASONE ACETATE 3; 3 MG/ML; MG/ML
6 INJECTION, SUSPENSION INTRA-ARTICULAR; INTRALESIONAL; INTRAMUSCULAR; SOFT TISSUE ONCE
Status: COMPLETED | OUTPATIENT
Start: 2023-01-05 | End: 2023-01-05

## 2023-01-05 RX ADMIN — BETAMETHASONE SODIUM PHOSPHATE AND BETAMETHASONE ACETATE 6 MG: 3; 3 INJECTION, SUSPENSION INTRA-ARTICULAR; INTRALESIONAL; INTRAMUSCULAR; SOFT TISSUE at 16:29:00

## 2023-01-05 NOTE — PROGRESS NOTES
After evaluation by Dr. Dionisio Reina, orders verified for 2cc celestone injection to right middle finger  1cc/6mg Betamethasone and 1cc/10mg 1% Lidocaine drawn up into one syringe for injection  Consent obtained and witnessed, and time-out performed by RN. Pt's vitals and pain score pre-and post-injection recorded in vitals section. Pt reclined in exam chair and armboard placed into position for injection  Pt tolerated procedure well and band-aid applied. Pt exam chair returned to sitting position and pt left office in satisfactory condition. Pt will call in one month if pain continues or worsens. Pt instructed to call the office w/any further questions and/or concerns.

## 2023-04-27 ENCOUNTER — OFFICE VISIT (OUTPATIENT)
Dept: SURGERY | Facility: CLINIC | Age: 53
End: 2023-04-27

## 2023-04-27 DIAGNOSIS — M65.331 TRIGGER MIDDLE FINGER OF RIGHT HAND: Primary | ICD-10-CM

## 2023-04-27 PROCEDURE — 99214 OFFICE O/P EST MOD 30 MIN: CPT | Performed by: PLASTIC SURGERY

## 2023-04-27 RX ORDER — HYDROCODONE BITARTRATE AND ACETAMINOPHEN 7.5; 325 MG/1; MG/1
1 TABLET ORAL
Qty: 10 TABLET | Refills: 0 | Status: SHIPPED | OUTPATIENT
Start: 2023-04-27

## 2023-04-27 NOTE — PROGRESS NOTES
Here for pre-op exam   LOV 1/5/23 RMF trigger received injection that was effective for 1 month   Symptoms have returned

## 2023-04-27 NOTE — PROGRESS NOTES
Patient request for surgery signed by patient and witnessed and signed by RN. Prescription for norco 7.5mg and narcotic prescription electronically sent to pharmacy per Dr. Letty Unger order and patient instructed to  prescription before surgery. Pre-Surgical Instruction Handout, Hand Elevation Handout, and Post-Operative Instruction Handout given to and reviewed w/patient. All questions and concerns answered; pt verbalized an understanding of all pre-operative teaching. Patient instructed to call the office with any further questions and/or concerns. Patient escorted to surgery scheduling to schedule surgery and post-operative appointments.

## 2023-05-05 ENCOUNTER — HOSPITAL ENCOUNTER (OUTPATIENT)
Facility: HOSPITAL | Age: 53
Setting detail: HOSPITAL OUTPATIENT SURGERY
Discharge: HOME | End: 2023-05-05
Attending: PLASTIC SURGERY | Admitting: PLASTIC SURGERY
Payer: COMMERCIAL

## 2023-05-05 ENCOUNTER — ANESTHESIA (OUTPATIENT)
Dept: SURGERY | Facility: HOSPITAL | Age: 53
End: 2023-05-05
Payer: COMMERCIAL

## 2023-05-05 ENCOUNTER — ANESTHESIA EVENT (OUTPATIENT)
Dept: SURGERY | Facility: HOSPITAL | Age: 53
End: 2023-05-05
Payer: COMMERCIAL

## 2023-05-05 VITALS
HEIGHT: 64 IN | SYSTOLIC BLOOD PRESSURE: 117 MMHG | BODY MASS INDEX: 27.49 KG/M2 | HEART RATE: 61 BPM | TEMPERATURE: 98 F | WEIGHT: 161 LBS | OXYGEN SATURATION: 98 % | DIASTOLIC BLOOD PRESSURE: 73 MMHG | RESPIRATION RATE: 14 BRPM

## 2023-05-05 PROCEDURE — 0LN70ZZ RELEASE RIGHT HAND TENDON, OPEN APPROACH: ICD-10-PCS | Performed by: PLASTIC SURGERY

## 2023-05-05 PROCEDURE — 26055 INCISE FINGER TENDON SHEATH: CPT | Performed by: PLASTIC SURGERY

## 2023-05-05 RX ORDER — NALOXONE HYDROCHLORIDE 0.4 MG/ML
80 INJECTION, SOLUTION INTRAMUSCULAR; INTRAVENOUS; SUBCUTANEOUS AS NEEDED
Status: DISCONTINUED | OUTPATIENT
Start: 2023-05-05 | End: 2023-05-05

## 2023-05-05 RX ORDER — MIDAZOLAM HYDROCHLORIDE 1 MG/ML
INJECTION INTRAMUSCULAR; INTRAVENOUS AS NEEDED
Status: DISCONTINUED | OUTPATIENT
Start: 2023-05-05 | End: 2023-05-05 | Stop reason: SURG

## 2023-05-05 RX ORDER — MORPHINE SULFATE 4 MG/ML
2 INJECTION, SOLUTION INTRAMUSCULAR; INTRAVENOUS EVERY 10 MIN PRN
Status: DISCONTINUED | OUTPATIENT
Start: 2023-05-05 | End: 2023-05-05

## 2023-05-05 RX ORDER — ACETAMINOPHEN 500 MG
1000 TABLET ORAL ONCE
Status: COMPLETED | OUTPATIENT
Start: 2023-05-05 | End: 2023-05-05

## 2023-05-05 RX ORDER — MORPHINE SULFATE 4 MG/ML
4 INJECTION, SOLUTION INTRAMUSCULAR; INTRAVENOUS EVERY 10 MIN PRN
Status: DISCONTINUED | OUTPATIENT
Start: 2023-05-05 | End: 2023-05-05

## 2023-05-05 RX ORDER — HYDROMORPHONE HYDROCHLORIDE 1 MG/ML
0.6 INJECTION, SOLUTION INTRAMUSCULAR; INTRAVENOUS; SUBCUTANEOUS EVERY 5 MIN PRN
Status: DISCONTINUED | OUTPATIENT
Start: 2023-05-05 | End: 2023-05-05

## 2023-05-05 RX ORDER — GLYCOPYRROLATE 0.2 MG/ML
INJECTION, SOLUTION INTRAMUSCULAR; INTRAVENOUS AS NEEDED
Status: DISCONTINUED | OUTPATIENT
Start: 2023-05-05 | End: 2023-05-05 | Stop reason: SURG

## 2023-05-05 RX ORDER — MORPHINE SULFATE 10 MG/ML
6 INJECTION, SOLUTION INTRAMUSCULAR; INTRAVENOUS EVERY 10 MIN PRN
Status: DISCONTINUED | OUTPATIENT
Start: 2023-05-05 | End: 2023-05-05

## 2023-05-05 RX ORDER — LIDOCAINE HYDROCHLORIDE 10 MG/ML
INJECTION, SOLUTION EPIDURAL; INFILTRATION; INTRACAUDAL; PERINEURAL AS NEEDED
Status: DISCONTINUED | OUTPATIENT
Start: 2023-05-05 | End: 2023-05-05 | Stop reason: SURG

## 2023-05-05 RX ORDER — DEXAMETHASONE SODIUM PHOSPHATE 4 MG/ML
VIAL (ML) INJECTION AS NEEDED
Status: DISCONTINUED | OUTPATIENT
Start: 2023-05-05 | End: 2023-05-05 | Stop reason: SURG

## 2023-05-05 RX ORDER — HYDROMORPHONE HYDROCHLORIDE 1 MG/ML
0.4 INJECTION, SOLUTION INTRAMUSCULAR; INTRAVENOUS; SUBCUTANEOUS EVERY 5 MIN PRN
Status: DISCONTINUED | OUTPATIENT
Start: 2023-05-05 | End: 2023-05-05

## 2023-05-05 RX ORDER — SODIUM CHLORIDE, SODIUM LACTATE, POTASSIUM CHLORIDE, CALCIUM CHLORIDE 600; 310; 30; 20 MG/100ML; MG/100ML; MG/100ML; MG/100ML
INJECTION, SOLUTION INTRAVENOUS CONTINUOUS
Status: DISCONTINUED | OUTPATIENT
Start: 2023-05-05 | End: 2023-05-05

## 2023-05-05 RX ORDER — ONDANSETRON 2 MG/ML
INJECTION INTRAMUSCULAR; INTRAVENOUS AS NEEDED
Status: DISCONTINUED | OUTPATIENT
Start: 2023-05-05 | End: 2023-05-05 | Stop reason: SURG

## 2023-05-05 RX ORDER — HYDROMORPHONE HYDROCHLORIDE 1 MG/ML
0.2 INJECTION, SOLUTION INTRAMUSCULAR; INTRAVENOUS; SUBCUTANEOUS EVERY 5 MIN PRN
Status: DISCONTINUED | OUTPATIENT
Start: 2023-05-05 | End: 2023-05-05

## 2023-05-05 RX ORDER — KETOROLAC TROMETHAMINE 30 MG/ML
INJECTION, SOLUTION INTRAMUSCULAR; INTRAVENOUS AS NEEDED
Status: DISCONTINUED | OUTPATIENT
Start: 2023-05-05 | End: 2023-05-05 | Stop reason: SURG

## 2023-05-05 RX ORDER — HYDROCODONE BITARTRATE AND ACETAMINOPHEN 7.5; 325 MG/1; MG/1
1 TABLET ORAL EVERY 4 HOURS PRN
Status: DISCONTINUED | OUTPATIENT
Start: 2023-05-05 | End: 2023-05-05

## 2023-05-05 RX ADMIN — ONDANSETRON 4 MG: 2 INJECTION INTRAMUSCULAR; INTRAVENOUS at 10:27:00

## 2023-05-05 RX ADMIN — KETOROLAC TROMETHAMINE 30 MG: 30 INJECTION, SOLUTION INTRAMUSCULAR; INTRAVENOUS at 10:31:00

## 2023-05-05 RX ADMIN — SODIUM CHLORIDE, SODIUM LACTATE, POTASSIUM CHLORIDE, CALCIUM CHLORIDE: 600; 310; 30; 20 INJECTION, SOLUTION INTRAVENOUS at 09:55:00

## 2023-05-05 RX ADMIN — SODIUM CHLORIDE, SODIUM LACTATE, POTASSIUM CHLORIDE, CALCIUM CHLORIDE: 600; 310; 30; 20 INJECTION, SOLUTION INTRAVENOUS at 10:28:00

## 2023-05-05 RX ADMIN — LIDOCAINE HYDROCHLORIDE 50 MG: 10 INJECTION, SOLUTION EPIDURAL; INFILTRATION; INTRACAUDAL; PERINEURAL at 09:59:00

## 2023-05-05 RX ADMIN — MIDAZOLAM HYDROCHLORIDE 2 MG: 1 INJECTION INTRAMUSCULAR; INTRAVENOUS at 09:55:00

## 2023-05-05 RX ADMIN — GLYCOPYRROLATE 0.1 MG: 0.2 INJECTION, SOLUTION INTRAMUSCULAR; INTRAVENOUS at 09:58:00

## 2023-05-05 RX ADMIN — DEXAMETHASONE SODIUM PHOSPHATE 4 MG: 4 MG/ML VIAL (ML) INJECTION at 10:16:00

## 2023-05-05 NOTE — ANESTHESIA PROCEDURE NOTES
Airway  Date/Time: 5/5/2023 10:05 AM  Urgency: Elective      General Information and Staff    Patient location during procedure: OR  Anesthesiologist: Shaun Rodriguez MD  Resident/CRNA: Nestor Shane CRNA  Performed: CRNA   Performed by: Nestor Shane CRNA  Authorized by: Shaun Rodriguez MD      Indications and Patient Condition  Indications for airway management: anesthesia  Sedation level: deep  Preoxygenated: yes  Patient position: sniffing  Mask difficulty assessment: 1 - vent by mask    Final Airway Details  Final airway type: supraglottic airway      Successful airway: classic  Size 4       Number of attempts at approach: 1

## 2023-05-05 NOTE — OPERATIVE REPORT
[facility]    PATIENT'S NAME: Aby YOUNG   ATTENDING PHYSICIAN: Osvaldo Yoder MD   OPERATING PHYSICIAN: Osvaldo Yoder MD   PATIENT ACCOUNT#:   [de-identified]    LOCATION:  [address]  MEDICAL RECORD #:   [id number]       YOB: 1970  ADMISSION DATE:       05/05/2023      OPERATION DATE:  05/05/2023    OPERATIVE REPORT      PREOPERATIVE DIAGNOSIS:  Right middle finger trigger. POSTOPERATIVE DIAGNOSIS:  Right middle finger trigger. PROCEDURE:  Right middle finger trigger release. INDICATIONS:  A 41-year-old female, right-hand dominant, with a right middle finger trigger. She had a sterid injection 5 January with relief and now has recurrent symptoms of pain, triggering, and locking. She is admitted to the operating amphitheater for trigger release. FINDINGS:  The right middle finger has MP tenderness with triggering and locking. OPERATIVE TECHNIQUE:  The patient was placed under general anesthesia. Hand and forearm were prepped and draped in usual sterile fashion. A pneumatic tourniquet was inflated to 250 mmHg. A zigzag incision was made over the first annular ligament. We dissected the first annular ligament from surrounding structures, then incised it longitudinally through its length. There was full excursion of the flexor tendons without crepitus, triggering, or locking. Skin edges were reapproximated with 4-0 nylon. A soft dressing was placed. The tourniquet was released. Total tourniquet time 14 minutes. The patient tolerated the procedure well and left the operating suite in satisfactory condition.      Dictated By Osvaldo Yoder MD  d: 05/05/2023 10:39:53  t: 05/05/2023 13:12:24  Job [id number]  EOB/    cc: MD Heather Blankenship MD

## 2023-05-05 NOTE — BRIEF OP NOTE
Pre-Operative Diagnosis: Trigger middle finger of right hand [M65.331]     Post-Operative Diagnosis: Trigger middle finger of right hand [M65.331]      Procedure Performed:   Trigger release of right middle finger    Surgeon(s) and Role:     * Payton Galeana MD - Primary    Assistant(s):        Surgical Findings: Trigger     Specimen: None     Estimated Blood Loss: Blood Output: 0 mL (5/5/2023 10:29 AM)      Dictation Number:      Marya Yadav MD  5/5/2023  10:38 AM

## 2023-05-05 NOTE — INTERVAL H&P NOTE
Pre-op Diagnosis: Trigger middle finger of right hand [M65.331]    The above referenced H&P was reviewed by Jamie Reyes MD on 5/5/2023, the patient was examined and no significant changes have occurred in the patient's condition since the H&P was performed. I discussed with the patient and/or legal representative the potential benefits, risks and side effects of this procedure; the likelihood of the patient achieving goals; and potential problems that might occur during recuperation. I discussed reasonable alternatives to the procedure, including risks, benefits and side effects related to the alternatives and risks related to not receiving this procedure. We will proceed with procedure as planned.

## 2023-05-06 ENCOUNTER — TELEPHONE (OUTPATIENT)
Dept: SURGERY | Facility: CLINIC | Age: 53
End: 2023-05-06

## 2023-05-06 NOTE — TELEPHONE ENCOUNTER
Left message for post-operative patient to please call the office with any questions and/or concerns. Reminded patient of OT appointment on 5/8 and 5/16. Dr. Eloisa Torres notified.

## 2023-05-08 ENCOUNTER — APPOINTMENT (OUTPATIENT)
Dept: SURGERY | Facility: CLINIC | Age: 53
End: 2023-05-08

## 2023-05-08 DIAGNOSIS — M25.641 JOINT STIFFNESS OF HAND, RIGHT: ICD-10-CM

## 2023-05-08 DIAGNOSIS — M62.81 DISTAL MUSCLE WEAKNESS: Primary | ICD-10-CM

## 2023-05-17 ENCOUNTER — APPOINTMENT (OUTPATIENT)
Dept: SURGERY | Facility: CLINIC | Age: 53
End: 2023-05-17

## 2023-05-17 DIAGNOSIS — M62.81 DISTAL MUSCLE WEAKNESS: Primary | ICD-10-CM

## 2023-05-17 DIAGNOSIS — M25.641 JOINT STIFFNESS OF HAND, RIGHT: ICD-10-CM

## 2023-05-17 PROCEDURE — 97166 OT EVAL MOD COMPLEX 45 MIN: CPT | Performed by: OCCUPATIONAL THERAPIST

## 2023-05-17 PROCEDURE — 97110 THERAPEUTIC EXERCISES: CPT | Performed by: OCCUPATIONAL THERAPIST

## 2023-05-18 NOTE — PROGRESS NOTES
OCCUPATIONAL THERAPY EVALUATION:   Stacy Ness   [id number]       SUBJECTIVE:    HX of Injury: RMF triggering  Chief Complaint:   Some tenderness at the surgical site. Precautions: None  Premorbid Functional Status: Independent w/ Occ. duties, Independent w/ driving / sitting, Independent w/ ADL's  Current Level of Function: As noted above. Employment: Working restricted duty  Hand Dominance: right  Living Situation: Not addressed  Barriers to Learning: None  Patient Goals: Full use of the right hand. Imaging/Tests: N/A        OBJECTIVE DATA:   PAIN:   Rating (1/10): 1/10 at rest,   Location:       SCAR/INCISION: Flat, Non-adhered and Flexible    SENSORY:  Intact    AROM/PROM:  (Degrees)  RIGHT HAND:    Thumb IF MF RF SF   MP   80     PIP   80     DIP   40     VILLA   200 degrees of VILLA.               STRENGTH: (lbs) Right Average Left Average   : NT NT   2 pt Pinch:     3 pt Pinch:     Lateral Pinch:         ASSESSMENT & PLAN OF CARE:    Treatment Provided: Patient was seen for an initial evaluation, hand washing: Sutures were removed per order: Reviewed cold cream scar massage technique  HEP:  AROM, Tendon glides, x 20 reps per set, x 5 sets daily. Reviewed hand elevation importance. Written handout was provided to reinforce today's treatment and educational session. Rehabilitation Potential: Good    CLINICAL ASSESSMENT:    Patient/Caregiver Education Provided: Yes    Treatment Plan:  Therapeutic Exercise  Therapeutic Activities  Scar Management  Patient/Family Education    GOALS:  Short term goals to be reached in x 1 session:    1) Independent with HEP. .    Long term goals to be reached by 05/22/2023:    1) Full functional use of the involved extremity for self-care, leisure and work related tasks:      Patient was seen 1 x /week for 2 weeks or a total of 2 visits. Pt. was advised regarding the findings of this evaluation and agrees to the plan of care.      ( No further OT needs at this time ) ( Discontinue OT, to call with questions and or concerns. )    Tere Greer     I have reviewed the treatment plan and concur.    Marya Yadav MD

## 2023-05-29 ENCOUNTER — HOSPITAL ENCOUNTER (OUTPATIENT)
Age: 53
Discharge: HOME | End: 2023-05-29
Attending: EMERGENCY MEDICINE
Payer: COMMERCIAL

## 2023-05-29 VITALS
RESPIRATION RATE: 16 BRPM | HEART RATE: 99 BPM | SYSTOLIC BLOOD PRESSURE: 120 MMHG | DIASTOLIC BLOOD PRESSURE: 58 MMHG | OXYGEN SATURATION: 97 % | TEMPERATURE: 98 F

## 2023-05-29 DIAGNOSIS — J06.9 VIRAL URI: Primary | ICD-10-CM

## 2023-05-29 LAB
S PYO AG THROAT QL IA.RAPID: NEGATIVE
SARS-COV-2 RNA RESP QL NAA+PROBE: NOT DETECTED

## 2023-05-29 PROCEDURE — 87651 STREP A DNA AMP PROBE: CPT | Performed by: EMERGENCY MEDICINE

## 2023-05-29 PROCEDURE — 99213 OFFICE O/P EST LOW 20 MIN: CPT

## 2023-05-29 PROCEDURE — 99212 OFFICE O/P EST SF 10 MIN: CPT

## 2023-06-15 ENCOUNTER — OFFICE VISIT (OUTPATIENT)
Dept: OBGYN CLINIC | Facility: CLINIC | Age: 53
End: 2023-06-15

## 2023-06-15 VITALS
DIASTOLIC BLOOD PRESSURE: 71 MMHG | BODY MASS INDEX: 28 KG/M2 | SYSTOLIC BLOOD PRESSURE: 114 MMHG | WEIGHT: 163.63 LBS | HEART RATE: 80 BPM

## 2023-06-15 DIAGNOSIS — Z01.419 ENCOUNTER FOR GYNECOLOGICAL EXAMINATION WITHOUT ABNORMAL FINDING: Primary | ICD-10-CM

## 2023-06-15 DIAGNOSIS — Z12.31 VISIT FOR SCREENING MAMMOGRAM: ICD-10-CM

## 2023-06-15 PROCEDURE — 3074F SYST BP LT 130 MM HG: CPT | Performed by: OBSTETRICS & GYNECOLOGY

## 2023-06-15 PROCEDURE — 99396 PREV VISIT EST AGE 40-64: CPT | Performed by: OBSTETRICS & GYNECOLOGY

## 2023-06-15 PROCEDURE — 3078F DIAST BP <80 MM HG: CPT | Performed by: OBSTETRICS & GYNECOLOGY

## 2023-07-26 ENCOUNTER — LAB ENCOUNTER (OUTPATIENT)
Dept: LAB | Age: 53
End: 2023-07-26
Attending: INTERNAL MEDICINE
Payer: COMMERCIAL

## 2023-07-26 ENCOUNTER — OFFICE VISIT (OUTPATIENT)
Dept: INTERNAL MEDICINE CLINIC | Facility: CLINIC | Age: 53
End: 2023-07-26

## 2023-07-26 VITALS
DIASTOLIC BLOOD PRESSURE: 77 MMHG | SYSTOLIC BLOOD PRESSURE: 117 MMHG | WEIGHT: 166 LBS | HEIGHT: 64 IN | BODY MASS INDEX: 28.34 KG/M2 | HEART RATE: 80 BPM | RESPIRATION RATE: 16 BRPM

## 2023-07-26 DIAGNOSIS — Z00.00 PHYSICAL EXAM, ANNUAL: Primary | ICD-10-CM

## 2023-07-26 DIAGNOSIS — Z00.00 PHYSICAL EXAM, ANNUAL: ICD-10-CM

## 2023-07-26 DIAGNOSIS — Z23 NEED FOR VACCINATION: ICD-10-CM

## 2023-07-26 LAB
ALBUMIN SERPL-MCNC: 3.6 G/DL (ref 3.4–5)
ALBUMIN/GLOB SERPL: 0.8 {RATIO} (ref 1–2)
ALP LIVER SERPL-CCNC: 116 U/L
ALT SERPL-CCNC: 24 U/L
ANION GAP SERPL CALC-SCNC: 8 MMOL/L (ref 0–18)
AST SERPL-CCNC: 17 U/L (ref 15–37)
BILIRUB SERPL-MCNC: 0.4 MG/DL (ref 0.1–2)
BUN BLD-MCNC: 7 MG/DL (ref 7–18)
BUN/CREAT SERPL: 8.1 (ref 10–20)
CALCIUM BLD-MCNC: 9.7 MG/DL (ref 8.5–10.1)
CHLORIDE SERPL-SCNC: 109 MMOL/L (ref 98–112)
CHOLEST SERPL-MCNC: 184 MG/DL (ref ?–200)
CO2 SERPL-SCNC: 25 MMOL/L (ref 21–32)
CREAT BLD-MCNC: 0.86 MG/DL
DEPRECATED RDW RBC AUTO: 37.5 FL (ref 35.1–46.3)
EGFRCR SERPLBLD CKD-EPI 2021: 80 ML/MIN/1.73M2 (ref 60–?)
ERYTHROCYTE [DISTWIDTH] IN BLOOD BY AUTOMATED COUNT: 12.9 % (ref 11–15)
FASTING PATIENT LIPID ANSWER: YES
FASTING STATUS PATIENT QL REPORTED: YES
GLOBULIN PLAS-MCNC: 4.4 G/DL (ref 2.8–4.4)
GLUCOSE BLD-MCNC: 94 MG/DL (ref 70–99)
HCT VFR BLD AUTO: 39.9 %
HDLC SERPL-MCNC: 58 MG/DL (ref 40–59)
HGB BLD-MCNC: 13.3 G/DL
LDLC SERPL CALC-MCNC: 110 MG/DL (ref ?–100)
MCH RBC QN AUTO: 27.1 PG (ref 26–34)
MCHC RBC AUTO-ENTMCNC: 33.3 G/DL (ref 31–37)
MCV RBC AUTO: 81.3 FL
NONHDLC SERPL-MCNC: 126 MG/DL (ref ?–130)
OSMOLALITY SERPL CALC.SUM OF ELEC: 292 MOSM/KG (ref 275–295)
PLATELET # BLD AUTO: 286 10(3)UL (ref 150–450)
POTASSIUM SERPL-SCNC: 4.3 MMOL/L (ref 3.5–5.1)
PROT SERPL-MCNC: 8 G/DL (ref 6.4–8.2)
RBC # BLD AUTO: 4.91 X10(6)UL
SODIUM SERPL-SCNC: 142 MMOL/L (ref 136–145)
TRIGL SERPL-MCNC: 89 MG/DL (ref 30–149)
TSI SER-ACNC: 0.96 MIU/ML (ref 0.36–3.74)
VLDLC SERPL CALC-MCNC: 15 MG/DL (ref 0–30)
WBC # BLD AUTO: 7.5 X10(3) UL (ref 4–11)

## 2023-07-26 PROCEDURE — 90471 IMMUNIZATION ADMIN: CPT | Performed by: INTERNAL MEDICINE

## 2023-07-26 PROCEDURE — 85027 COMPLETE CBC AUTOMATED: CPT

## 2023-07-26 PROCEDURE — 99396 PREV VISIT EST AGE 40-64: CPT | Performed by: INTERNAL MEDICINE

## 2023-07-26 PROCEDURE — 80061 LIPID PANEL: CPT

## 2023-07-26 PROCEDURE — 36415 COLL VENOUS BLD VENIPUNCTURE: CPT

## 2023-07-26 PROCEDURE — 3074F SYST BP LT 130 MM HG: CPT | Performed by: INTERNAL MEDICINE

## 2023-07-26 PROCEDURE — 84443 ASSAY THYROID STIM HORMONE: CPT

## 2023-07-26 PROCEDURE — 90715 TDAP VACCINE 7 YRS/> IM: CPT | Performed by: INTERNAL MEDICINE

## 2023-07-26 PROCEDURE — 3078F DIAST BP <80 MM HG: CPT | Performed by: INTERNAL MEDICINE

## 2023-07-26 PROCEDURE — 80053 COMPREHEN METABOLIC PANEL: CPT

## 2023-07-26 PROCEDURE — 3008F BODY MASS INDEX DOCD: CPT | Performed by: INTERNAL MEDICINE

## 2023-08-26 ENCOUNTER — HOSPITAL ENCOUNTER (OUTPATIENT)
Dept: MAMMOGRAPHY | Facility: HOSPITAL | Age: 53
Discharge: HOME | End: 2023-08-26
Attending: OBSTETRICS & GYNECOLOGY
Payer: COMMERCIAL

## 2023-08-26 DIAGNOSIS — Z12.31 VISIT FOR SCREENING MAMMOGRAM: ICD-10-CM

## 2023-08-26 PROCEDURE — 77067 SCR MAMMO BI INCL CAD: CPT | Performed by: OBSTETRICS & GYNECOLOGY

## 2023-08-26 PROCEDURE — 77063 BREAST TOMOSYNTHESIS BI: CPT | Performed by: OBSTETRICS & GYNECOLOGY

## 2024-06-26 ENCOUNTER — OFFICE VISIT (OUTPATIENT)
Dept: OBGYN CLINIC | Facility: CLINIC | Age: 54
End: 2024-06-26

## 2024-06-26 VITALS
SYSTOLIC BLOOD PRESSURE: 111 MMHG | HEART RATE: 79 BPM | WEIGHT: 166 LBS | DIASTOLIC BLOOD PRESSURE: 73 MMHG | BODY MASS INDEX: 28 KG/M2

## 2024-06-26 DIAGNOSIS — Z12.31 VISIT FOR SCREENING MAMMOGRAM: ICD-10-CM

## 2024-06-26 DIAGNOSIS — Z01.419 ENCOUNTER FOR GYNECOLOGICAL EXAMINATION WITHOUT ABNORMAL FINDING: Primary | ICD-10-CM

## 2024-06-26 PROCEDURE — 3078F DIAST BP <80 MM HG: CPT | Performed by: OBSTETRICS & GYNECOLOGY

## 2024-06-26 PROCEDURE — 3074F SYST BP LT 130 MM HG: CPT | Performed by: OBSTETRICS & GYNECOLOGY

## 2024-06-26 PROCEDURE — 99396 PREV VISIT EST AGE 40-64: CPT | Performed by: OBSTETRICS & GYNECOLOGY

## 2024-06-26 NOTE — PROGRESS NOTES
Berry Solis is a 54 year old female  Patient's last menstrual period was 2021. who presents for   Chief Complaint   Patient presents with    Gyn Exam     Annual, mammogram order   .  She has no complaints.    OBSTETRICS HISTORY:  OB History    Para Term  AB Living   0 0 0 0 0 0   SAB IAB Ectopic Multiple Live Births   0 0 0 0 0       GYNE HISTORY:        MEDICAL HISTORY:  Past Medical History:    Hemorrhoid    hemorrhoidectomy    Trigger middle finger of right hand    Visual impairment       SURGICAL HISTORY:  Past Surgical History:   Procedure Laterality Date    Colonoscopy  2019    sigmoid polyp    Colonoscopy N/A 2019    Procedure: COLONOSCOPY, POSSIBLE BIOPSY, POSSIBLE POLYPECTOMY 36023;  Surgeon: Shant Atwood MD;  Location: [facility]    Hemorrhoidectomy  2002    Incise finger tendon sheath Right 2023    Trigger release of right middle finger       SOCIAL HISTORY:  Social History     Socioeconomic History    Marital status: Single   Tobacco Use    Smoking status: Never     Passive exposure: Never    Smokeless tobacco: Never   Vaping Use    Vaping status: Never Used   Substance and Sexual Activity    Alcohol use: No     Alcohol/week: 0.0 standard drinks of alcohol    Drug use: No    Sexual activity: Yes     Partners: Male     Birth control/protection: Condom     Comment: none   Other Topics Concern    Caffeine Concern Yes     Comment: soda, chocolate, occasionally    Reaction to local anesthetic No    Right Handed Yes         FAMILY HISTORY:  Family History   Problem Relation Age of Onset    Hypertension Mother     Breast Cancer Mother 66    Cancer Maternal Grandmother 60        pancreatic; non-smoker (grandpa smoked)    Pancreatic Cancer Maternal Grandmother 60    Diabetes Paternal Aunt     Breast Cancer Paternal Aunt 50    Breast Cancer Paternal Aunt 60       MEDICATIONS:    Current Outpatient Medications:     ZINC OR, Use as  directed in the mouth or throat., Disp: , Rfl:     Vitamin B-12 1000 MCG Oral Tab, Take 1 tablet (1,000 mcg total) by mouth daily., Disp: , Rfl:     Ascorbic Acid (VITAMIN C) 1000 MG Oral Tab, Take 1 tablet (1,000 mg total) by mouth daily., Disp: , Rfl:     BIOTIN OR, Take by mouth., Disp: , Rfl:     Ergocalciferol (VITAMIN D OR), Take 1 tablet by mouth daily.  , Disp: , Rfl:     Multiple Vitamins-Minerals (ONE-A-DAY WOMENS VITACRAVES) Oral Chew Tab, Chew 1 tablet by mouth daily., Disp: , Rfl:     ALLERGIES:  No Known Allergies      Review of Systems:  Constitutional:  Denies fatigue, night sweats, hot flashes  Eyes:  denies blurred or double vision  Cardiovascular:  denies chest pain or palpitations  Respiratory:  denies shortness of breath  Gastrointestinal:  denies heartburn, abdominal pain, diarrhea or constipation  Genitourinary:  denies dysuria, incontinence, abnormal vaginal discharge, vaginal itching  Musculoskeletal:  denies back pain.  Skin/Breast:  Denies any breast pain, lumps, or discharge.   Neurological:  denies headaches, extremity weakness or numbness.  Psychiatric: denies depression or anxiety.  Endocrine:   denies excessive thirst or urination.  Heme/Lymph:  denies history of anemia, easy bruising or bleeding.    Depression Screening (PHQ-2/PHQ-9): Over the LAST 2 WEEKS   Little interest or pleasure in doing things (over the last two weeks)?: Not at all    Feeling down, depressed, or hopeless (over the last two weeks)?: Not at all    PHQ-2 SCORE: 0         Blood pressure 111/73, pulse 79, weight 166 lb (75.3 kg), last menstrual period 11/01/2021, not currently breastfeeding.    PHYSICAL EXAM:   Constitutional: well developed, well nourished  Head/Face: normocephalic  Neck/Thyroid: thyroid symmetric, no thyromegaly, no nodules, no adenopathy  Lymphatic:no abnormal supraclavicular or axillary adenopathy is noted  Breast: normal without palpable masses, tenderness, asymmetry, nipple discharge, nipple  retraction or skin changes  Respiratory:  lungs clear to auscultation bilaterally  Cardiovascular: regular rate and rhythm, no significant murmur  Abdomen:  soft, nontender, nondistended, no masses  Skin/Hair: no unusual rashes or bruises  Extremities: no edema, no cyanosis  Psychiatric:  Oriented to time, place, person and situation. Appropriate mood and affect    Pelvic Exam:  External Genitalia: normal appearance, hair distribution, and no lesions  Urethral Meatus:  normal in size, location, without lesions and prolapse  Bladder:  No fullness, masses or tenderness  Vagina:  Normal appearance without lesions, no abnormal discharge  Cervix:  Normal without tenderness on motion  Uterus: normal in size, contour, position, mobility, without tenderness  Adnexa: normal without masses or tenderness  Perineum: normal  Rectovaginal: no masses, normal tone  Anus: no thrombosed or ulcerated hemorroids    Assessment & Plan:   ASCCP guidelines discussed,cotest done,mammogram ordered,rtc 1 year for annual exam   SBE encouraged  Encounter Diagnoses   Name Primary?    Encounter for gynecological examination without abnormal finding Yes    Visit for screening mammogram      Orders Placed This Encounter   Procedures    Hpv Dna  High Risk , Thin Prep Collect    ThinPrep PAP Smear     Requested Prescriptions      No prescriptions requested or ordered in this encounter     [facility] YE 2D+3D SCREENING BILAT (CPT=77067/60185)

## 2024-06-27 LAB — HPV E6+E7 MRNA CVX QL NAA+PROBE: NEGATIVE

## 2024-07-29 ENCOUNTER — LAB ENCOUNTER (OUTPATIENT)
Dept: LAB | Age: 54
End: 2024-07-29
Attending: INTERNAL MEDICINE
Payer: COMMERCIAL

## 2024-07-29 ENCOUNTER — OFFICE VISIT (OUTPATIENT)
Dept: INTERNAL MEDICINE CLINIC | Facility: CLINIC | Age: 54
End: 2024-07-29
Payer: COMMERCIAL

## 2024-07-29 VITALS
SYSTOLIC BLOOD PRESSURE: 115 MMHG | BODY MASS INDEX: 28.62 KG/M2 | WEIGHT: 167.63 LBS | DIASTOLIC BLOOD PRESSURE: 75 MMHG | HEART RATE: 82 BPM | OXYGEN SATURATION: 96 % | HEIGHT: 64 IN

## 2024-07-29 DIAGNOSIS — N39.3 STRESS INCONTINENCE: ICD-10-CM

## 2024-07-29 DIAGNOSIS — E55.9 VITAMIN D DEFICIENCY: ICD-10-CM

## 2024-07-29 DIAGNOSIS — H61.23 BILATERAL IMPACTED CERUMEN: ICD-10-CM

## 2024-07-29 DIAGNOSIS — Z00.00 PHYSICAL EXAM, ANNUAL: Primary | ICD-10-CM

## 2024-07-29 DIAGNOSIS — Z00.00 PHYSICAL EXAM, ANNUAL: ICD-10-CM

## 2024-07-29 LAB
ALBUMIN SERPL-MCNC: 4.5 G/DL (ref 3.2–4.8)
ALBUMIN/GLOB SERPL: 1.3 {RATIO} (ref 1–2)
ALP LIVER SERPL-CCNC: 109 U/L
ALT SERPL-CCNC: 16 U/L
ANION GAP SERPL CALC-SCNC: 8 MMOL/L (ref 0–18)
AST SERPL-CCNC: 19 U/L (ref ?–34)
BILIRUB SERPL-MCNC: 0.4 MG/DL (ref 0.3–1.2)
BUN BLD-MCNC: 9 MG/DL (ref 9–23)
BUN/CREAT SERPL: 10.3 (ref 10–20)
CALCIUM BLD-MCNC: 9.8 MG/DL (ref 8.7–10.4)
CHLORIDE SERPL-SCNC: 107 MMOL/L (ref 98–112)
CHOLEST SERPL-MCNC: 217 MG/DL (ref ?–200)
CO2 SERPL-SCNC: 26 MMOL/L (ref 21–32)
CREAT BLD-MCNC: 0.87 MG/DL
DEPRECATED RDW RBC AUTO: 38.1 FL (ref 35.1–46.3)
EGFRCR SERPLBLD CKD-EPI 2021: 79 ML/MIN/1.73M2 (ref 60–?)
ERYTHROCYTE [DISTWIDTH] IN BLOOD BY AUTOMATED COUNT: 12.8 % (ref 11–15)
FASTING PATIENT LIPID ANSWER: YES
FASTING STATUS PATIENT QL REPORTED: YES
GLOBULIN PLAS-MCNC: 3.5 G/DL (ref 2–3.5)
GLUCOSE BLD-MCNC: 103 MG/DL (ref 70–99)
HCT VFR BLD AUTO: 40.1 %
HDLC SERPL-MCNC: 52 MG/DL (ref 40–59)
HGB BLD-MCNC: 13.1 G/DL
LDLC SERPL CALC-MCNC: 144 MG/DL (ref ?–100)
MCH RBC QN AUTO: 26.8 PG (ref 26–34)
MCHC RBC AUTO-ENTMCNC: 32.7 G/DL (ref 31–37)
MCV RBC AUTO: 82 FL
NONHDLC SERPL-MCNC: 165 MG/DL (ref ?–130)
OSMOLALITY SERPL CALC.SUM OF ELEC: 291 MOSM/KG (ref 275–295)
PLATELET # BLD AUTO: 270 10(3)UL (ref 150–450)
POTASSIUM SERPL-SCNC: 4.3 MMOL/L (ref 3.5–5.1)
PROT SERPL-MCNC: 8 G/DL (ref 5.7–8.2)
RBC # BLD AUTO: 4.89 X10(6)UL
SODIUM SERPL-SCNC: 141 MMOL/L (ref 136–145)
TRIGL SERPL-MCNC: 119 MG/DL (ref 30–149)
TSI SER-ACNC: 0.66 MIU/ML (ref 0.55–4.78)
VIT D+METAB SERPL-MCNC: 41.4 NG/ML (ref 30–100)
VLDLC SERPL CALC-MCNC: 22 MG/DL (ref 0–30)
WBC # BLD AUTO: 6.6 X10(3) UL (ref 4–11)

## 2024-07-29 PROCEDURE — 82306 VITAMIN D 25 HYDROXY: CPT

## 2024-07-29 PROCEDURE — 80061 LIPID PANEL: CPT

## 2024-07-29 PROCEDURE — 84443 ASSAY THYROID STIM HORMONE: CPT

## 2024-07-29 PROCEDURE — 36415 COLL VENOUS BLD VENIPUNCTURE: CPT

## 2024-07-29 PROCEDURE — 80053 COMPREHEN METABOLIC PANEL: CPT

## 2024-07-29 PROCEDURE — 85027 COMPLETE CBC AUTOMATED: CPT

## 2024-07-29 NOTE — PROGRESS NOTES
Berry Solis is a 54 year old female.  Chief Complaint   Patient presents with    Physical       HPI:   Patient comes for her annual physical  C/C annual physical  C/o patient comes for annual physical, overall doing well without any complaints    HISTORY  had right middle finger trigger finger surgery and now is doing better     Lives alone, teacher -KG      Current Outpatient Medications   Medication Sig Dispense Refill    ZINC OR Use as directed in the mouth or throat.      Vitamin B-12 1000 MCG Oral Tab Take 1 tablet (1,000 mcg total) by mouth daily.      Ascorbic Acid (VITAMIN C) 1000 MG Oral Tab Take 1 tablet (1,000 mg total) by mouth daily.      BIOTIN OR Take by mouth.      Ergocalciferol (VITAMIN D OR) Take 1 tablet by mouth daily.        Multiple Vitamins-Minerals (ONE-A-DAY WOMENS VITACRAVES) Oral Chew Tab Chew 1 tablet by mouth daily.        Past Medical History:    Hemorrhoid    hemorrhoidectomy    Trigger middle finger of right hand    Visual impairment      Past Surgical History:   Procedure Laterality Date    Colonoscopy  06/27/2019    sigmoid polyp    Colonoscopy N/A 06/27/2019    Procedure: COLONOSCOPY, POSSIBLE BIOPSY, POSSIBLE POLYPECTOMY 92959;  Surgeon: Shant Atwood MD;  [place] [facility]    Hemorrhoidectomy  2002    Incise finger tendon sheath Right 05/05/2023    Trigger release of right middle finger      Social History:  Social History     Socioeconomic History    Marital status: Single   Tobacco Use    Smoking status: Never     Passive exposure: Never    Smokeless tobacco: Never   Vaping Use    Vaping status: Never Used   Substance and Sexual Activity    Alcohol use: No     Alcohol/week: 0.0 standard drinks of alcohol    Drug use: No    Sexual activity: Yes     Partners: Male     Birth control/protection: Condom     Comment: none   Other Topics Concern    Caffeine Concern Yes     Comment: soda, chocolate, occasionally    Reaction to local anesthetic No     Right Handed Yes        REVIEW OF SYSTEMS:   GENERAL HEALTH: No fevers, chills, sweats, fatigue  VISION: No recent vision problems, blurry vision or double vision  HEENT: No decreased hearing ear pain nasal congestion or sore throat  SKIN: denies any unusual skin lesions or rashes  RESPIRATORY: denies shortness of breath, cough, wheezing  CARDIOVASCULAR: denies chest pain on exertion, palpitations, swelling in feet  GI: denies abdominal pain and denies heartburn, nausea or vomiting  : No Pain on urination, change in the color of urine, discharge, urinating frequently  MUS: No back pain, joint pain, muscle pain  NEURO: denies headaches , anxiety, depression    EXAM:   /75   Pulse 82   Ht 5' 4\" (1.626 m)   Wt 167 lb 9.6 oz (76 kg)   LMP 11/01/2021   SpO2 96%   BMI 28.77 kg/m²   GENERAL: well developed, well nourished,in no apparent distress   SKIN: no rashes,no suspicious lesions  HEENT: atraumatic, normocephalic,ears-bilateral impacted cerumen and throat are clear, no frontal or maxillary sinus tenderness, pupils equal reactive to light bilaterally, extraocular muscles intact  NECK: supple,no adenopathy,nontender   LUNGS: clear to auscultation, no wheeze  CARDIO: RRR without murmur  GI: good BS's,no masses or tenderness  EXTREMITIES: no cyanosis, or edema  BREAST: Bilateral breasts without any lumps, bilateral axilla without any lymphadenopathy, no nipple retraction or  discharge      ASSESSMENT AND PLAN:   Diagnoses and all orders for this visit:    Physical exam, annual  -     Comp Metabolic Panel (14); Future  -     Lipid Panel; Future  -     Assay, Thyroid Stim Hormone; Future  -     Vitamin D; Future  -     CBC, Platelet; No Differential; Future    Vitamin D deficiency  -     Vitamin D; Future    Stress incontinence  -     PHYSICAL THERAPY - INTERNAL    Bilateral impacted cerumen      Advised patient to watch what she eats and exercise, seatbelt use no texting driving, sunscreen use  advised  Check labs today since she is fasting  Can try Debrox eardrops for her impacted cerumen but if not working she can see ENT-has seen them in the past once for flushing   advised patient to do Kegel exercises and will refer her for pelvic floor therapy              Preventive medicine  Labs- 7/2023    to be done today since she is  fasting  Pap smear-6/2024, sees Dr. Tenorio  Mammogram 8/2023   Colonoscopy dr barbour in 2019 and rpt in 5- 10 yrs ie 2029    The patient indicates understanding of these issues and agrees to the plan.  No follow-ups on file.

## 2024-08-16 ENCOUNTER — HOSPITAL ENCOUNTER (OUTPATIENT)
Age: 54
Discharge: HOME | End: 2024-08-16
Payer: COMMERCIAL

## 2024-08-16 ENCOUNTER — NURSE TRIAGE (OUTPATIENT)
Dept: INTERNAL MEDICINE CLINIC | Facility: CLINIC | Age: 54
End: 2024-08-16

## 2024-08-16 VITALS
RESPIRATION RATE: 16 BRPM | TEMPERATURE: 98 F | OXYGEN SATURATION: 97 % | DIASTOLIC BLOOD PRESSURE: 67 MMHG | SYSTOLIC BLOOD PRESSURE: 117 MMHG | HEART RATE: 72 BPM

## 2024-08-16 DIAGNOSIS — S10.96XA INSECT BITE OF NECK, INITIAL ENCOUNTER: Primary | ICD-10-CM
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
## 2024-08-16 PROCEDURE — 99213 OFFICE O/P EST LOW 20 MIN: CPT | Performed by: NURSE PRACTITIONER

## 2024-08-16 RX ORDER — TRIAMCINOLONE ACETONIDE 1 MG/G
1 OINTMENT TOPICAL 2 TIMES DAILY
Qty: 30 G | Refills: 0 | Status: SHIPPED | OUTPATIENT
Start: 2024-08-16 | End: 2024-08-23

## 2024-08-16 NOTE — ED INITIAL ASSESSMENT (HPI)
Pt states Wednesday felt some itching on her back and Thursday noticed redness and increased itching and today woke up with some pain in the area. Pt states concerned for mite bite.

## 2024-08-16 NOTE — ED PROVIDER NOTES
Patient Seen in: [facility]      History     Chief Complaint   Patient presents with    Rash Skin Problem     Stated Complaint: Skin Problem    Subjective:   Well-appearing 54-year-old female with no significant past medical history presents with complaints of a rash to the left aspect of her neck that she noticed today morning.  Patient communicates pruritus.  Patient denies wound drainage.  Patient communicates that throughout the day she has noticed increased redness and itching.  Patient communicates concerns about a possible mite bite.              Objective:   Past Medical History:    Hemorrhoid    hemorrhoidectomy    Trigger middle finger of right hand    Visual impairment              Past Surgical History:   Procedure Laterality Date    Colonoscopy  06/27/2019    sigmoid polyp    Colonoscopy N/A 06/27/2019    Procedure: COLONOSCOPY, POSSIBLE BIOPSY, POSSIBLE POLYPECTOMY 56080;  Surgeon: Shant Atwood MD;  [place] [facility] [place] [facility]    Hemorrhoidectomy  2002    Incise finger tendon sheath Right 05/05/2023    Trigger release of right middle finger                Social History     Socioeconomic History    Marital status: Single   Tobacco Use    Smoking status: Never     Passive exposure: Never    Smokeless tobacco: Never   Vaping Use    Vaping status: Never Used   Substance and Sexual Activity    Alcohol use: No     Alcohol/week: 0.0 standard drinks of alcohol    Drug use: No    Sexual activity: Yes     Partners: Male     Birth control/protection: Condom     Comment: none   Other Topics Concern    Caffeine Concern Yes     Comment: soda, chocolate, occasionally    Reaction to local anesthetic No    Right Handed Yes              Review of Systems    Positive for stated Chief Complaint: Rash Skin Problem    Other systems are as noted in HPI.  Constitutional and vital signs reviewed.      All other systems reviewed and negative except as noted above.    Physical Exam     ED Triage  Vitals [08/16/24 1724]   /67   Pulse 72   Resp 16   Temp 97.7 °F (36.5 °C)   Temp src Temporal   SpO2 97 %   O2 Device None (Room air)       Current Vitals:   Vital Signs  BP: 117/67  Pulse: 72  Resp: 16  Temp: 97.7 °F (36.5 °C)  Temp src: Temporal    Oxygen Therapy  SpO2: 97 %  O2 Device: None (Room air)      Physical Exam  VS: Vital signs reviewed. 02 saturation within normal limits for this patient.    General: Patient is awake and alert, oriented to person, place and time. Pt appears non-toxic.     HEENT: Head is normocephalic, atraumatic.  Nonicteric sclera, no conjunctival injection. No facial droop or slurred speech. No oral lesions or pallor. Mucous membranes moist.     Neck: No cervical lymphadenopathy. Supple. Normal ROM.    Lungs: Good inspiratory effort.  No accessory muscle use or tachypnea.    Back: Normal inspection, no tenderness.     Extremities: No focal swelling or tenderness. Capillary refill noted.     Skin: Warm, dry and normal in color.   1 erythematous papule present to left aspect of dorsal neck.  Nontender to palpation.  No induration.  No red streaking.  No surrounding warmth.    Psychiatric: Normal affect, judgement normal, insight normal.     CNS: Moves all 4 extremities. Interacts appropriately. No gait abnormality. Memory normal.        ED Course   Labs Reviewed - No data to display    MDM   Medical Decision Making  Well-appearing.  Exam notable for insect bite with local reaction.  No underlying bacterial infection.  Prescription for triamcinolone 0.1% external ointment was sent to pharmacy on file.  Refrain from scratching.  Cool compresses as needed.  Differential diagnosis considered included cellulitis versus folliculitis versus insect bite.  Close PMD/Derm follow-up as needed.    Problems Addressed:  Insect bite of neck, initial encounter: acute illness or injury    Risk  Prescription drug management.        Disposition and Plan     Clinical Impression:  1. Insect bite of  neck, initial encounter         Disposition:  Discharge  8/16/2024  6:08 pm    Follow-up:  Yocasta Mcelroy MD  [address] [place], [place] [id number] [phone]    In 1 week  As needed          Medications Prescribed:  Discharge Medication List as of 8/16/2024  6:09 PM        START taking these medications    Details   triamcinolone 0.1 % External Ointment Apply 1 Application topically 2 (two) times daily for 7 days., Normal, Disp-30 g, R-0

## 2024-08-16 NOTE — TELEPHONE ENCOUNTER
Patient called back for directions.  Advised on [facility] which is nearby patient's location.  She will go there.

## 2024-08-16 NOTE — TELEPHONE ENCOUNTER
Patient seeking appointment.   Wednesday noticed rash, two spots on neck, and on shoulder.   Itchy.   No available appointment today. Patient is advised to proceed to walk in clinic/Immediate care for evaluation.  Patient has address to [facility] walk in clinic.   Reason for Disposition   Patient wants to be seen    Protocols used: Rash or Redness - [id number]

## 2024-08-17 ENCOUNTER — TELEPHONE (OUTPATIENT)
Dept: INTERNAL MEDICINE CLINIC | Facility: CLINIC | Age: 54
End: 2024-08-17

## 2024-08-17 ENCOUNTER — OFFICE VISIT (OUTPATIENT)
Dept: INTERNAL MEDICINE CLINIC | Facility: CLINIC | Age: 54
End: 2024-08-17
Payer: COMMERCIAL

## 2024-08-17 VITALS
SYSTOLIC BLOOD PRESSURE: 115 MMHG | HEART RATE: 75 BPM | DIASTOLIC BLOOD PRESSURE: 72 MMHG | HEIGHT: 64 IN | WEIGHT: 167.19 LBS | BODY MASS INDEX: 28.54 KG/M2

## 2024-08-17 DIAGNOSIS — R59.0 ENLARGED LYMPH NODE IN NECK: Primary | ICD-10-CM
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
## 2024-08-17 DIAGNOSIS — R73.9 ELEVATED BLOOD SUGAR: ICD-10-CM

## 2024-08-17 DIAGNOSIS — S10.86XA INSECT BITE OF OTHER PART OF NECK, INITIAL ENCOUNTER: ICD-10-CM

## 2024-08-17 PROCEDURE — 99214 OFFICE O/P EST MOD 30 MIN: CPT | Performed by: INTERNAL MEDICINE

## 2024-08-17 PROCEDURE — 3074F SYST BP LT 130 MM HG: CPT | Performed by: INTERNAL MEDICINE

## 2024-08-17 PROCEDURE — 3078F DIAST BP <80 MM HG: CPT | Performed by: INTERNAL MEDICINE

## 2024-08-17 PROCEDURE — G2211 COMPLEX E/M VISIT ADD ON: HCPCS | Performed by: INTERNAL MEDICINE

## 2024-08-17 PROCEDURE — 3008F BODY MASS INDEX DOCD: CPT | Performed by: INTERNAL MEDICINE

## 2024-08-17 RX ORDER — AMOXICILLIN AND CLAVULANATE POTASSIUM 875; 125 MG/1; MG/1
1 TABLET, FILM COATED ORAL 2 TIMES DAILY
Qty: 10 TABLET | Refills: 0 | Status: SHIPPED | OUTPATIENT
Start: 2024-08-17

## 2024-08-17 NOTE — TELEPHONE ENCOUNTER
Name:SATURDAY TRIAGE RN                      8/17/2024 9:29:06 AM  ProfileId:    [id number]                   [facility]  Department:[place] ANSWERING SERVICE  ======================================================================  Text Messages    Message # [id number]         08/17/2024 09:28a   [RACHEL]  To:  SATURDAY TRIAGE RN  From:  AG Gregory MD:  SARITHA  Phone#:  [phone]  ----------------------------------------------------------------------  LOMBARD - SHE HAS A LUMP THAT IS LARGER THAN YESTERDAY - SHE WAS AT THE [facility] AND HAS A CREAM - BUT IT HAS CHANGED SHAPE AND SIZE    Seen in Urgent Care yesterday for rash to the left aspect of her neck and was provided triamcinolone 0.1 %.  Spoke with patient at the same location near hairline she noticed a hard lump,  similar to the size of a pea or grape. Painful to touch and when she turns her neck. She is concerned about this and is asking if she can possibly be seen today at all?     Dr. Mcelroy, are you able to see this patient today at all?    Please advise    We can call her at [phone] if so.

## 2024-08-17 NOTE — PROGRESS NOTES
Berry Solis is a 54 year old female.  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 10 tablet 0    triamcinolone 0.1 % External Ointment Apply 1 Application topically 2 (two) times daily for 7 days. 30 g 0    ZINC OR Use as directed in the mouth or throat.      Vitamin B-12 1000 MCG Oral Tab Take 1 tablet (1,000 mcg total) by mouth daily.      Ascorbic Acid (VITAMIN C) 1000 MG Oral Tab Take 1 tablet (1,000 mg total) by mouth daily.      BIOTIN OR Take by mouth.      Ergocalciferol (VITAMIN D OR) Take 1 tablet by mouth daily.        Multiple Vitamins-Minerals (ONE-A-DAY WOMENS VITACRAVES) Oral Chew Tab Chew 1 tablet by mouth daily.        Past Medical History:    Hemorrhoid    hemorrhoidectomy    Trigger middle finger of right hand    Visual impairment      Past Surgical History:   Procedure Laterality Date    Colonoscopy  06/27/2019    sigmoid polyp    Colonoscopy N/A 06/27/2019    Procedure: COLONOSCOPY, POSSIBLE BIOPSY, POSSIBLE POLYPECTOMY 66983;  Surgeon: Shant Atwood MD;  [place] [facility] [place] [facility] [place]     Hemorrhoidectomy  2002    Incise finger tendon sheath Right 05/05/2023    Trigger release of right middle finger      Social History:  Social History     Socioeconomic History    Marital status: Single   Tobacco Use    Smoking status: Never     Passive exposure: Never    Smokeless tobacco: Never   Vaping Use    Vaping status: Never Used   Substance and Sexual Activity    Alcohol use: No     Alcohol/week: 0.0 standard drinks of alcohol    Drug use: No    Sexual activity: Yes     Partners: Male     Birth control/protection: Condom     Comment: none   Other Topics Concern    Caffeine Concern Yes     Comment: soda, chocolate, occasionally    Reaction to local anesthetic No    Right Handed Yes        REVIEW OF SYSTEMS:   GENERAL HEALTH: No fevers, chills, sweats, fatigue  SKIN:+rashes/bite   RESPIRATORY: denies shortness of breath, cough, wheezing  NEURO: denies headaches     EXAM:   /72   Pulse 75   Ht 5' 4\" (1.626 m)   Wt 167 lb 3.2 oz (75.8 kg)   LMP 11/01/2021   BMI 28.70 kg/m²   GENERAL: well developed, well nourished,in no apparent distress  SKIN: There is a red bite on the left side suprascapular region and an enlarged lymph node that is tender in the left side posterior circular region  HEENT: atraumatic, normocephalic  LUNGS: clear to auscultation, no wheeze  CARDIO: RRR without murmur  EXTREMITIES: no cyanosis, or edema    ASSESSMENT AND PLAN:   Diagnoses and all orders for this visit:    Enlarged lymph node in neck    Insect bite of other part of neck, initial encounter  -     amoxicillin clavulanate 875-125 MG Oral Tab; Take 1 tablet by mouth 2 (two) times daily.    Elevated blood sugar  -     Hemoglobin A1C; Future      Will give antibiotics since it is enlarging and more painful and advised her not to touch the area and not to scratch the bite  Patient can use the triamcinolone ointment but may be better if she can Can use calamine lotion or cold compresses  Monitor for fevers if not better  within the next couple weeks then she needs to come back so that the ultrasound can be done              Preventive medicine  Labs- 7/2023 and 7/2024     to be done today since she is  fasting  Pap smear-6/2024, sees Dr. Tenorio  Mammogram 8/2023   Colonoscopy dr barbour in 2019 and rpt in 5- 10 yrs ie 2029    The patient indicates understanding of these issues and agrees to the plan.  No follow-ups on file.

## 2024-08-17 NOTE — TELEPHONE ENCOUNTER
Dr. Mcelroy advised verbal she can see patient today at 11:45, I called patient and advised her.  She will come in today.

## 2024-10-12 ENCOUNTER — HOSPITAL ENCOUNTER (OUTPATIENT)
Dept: MAMMOGRAPHY | Facility: HOSPITAL | Age: 54
Discharge: HOME | End: 2024-10-12
Attending: OBSTETRICS & GYNECOLOGY
Payer: COMMERCIAL

## 2024-10-12 DIAGNOSIS — Z12.31 VISIT FOR SCREENING MAMMOGRAM: ICD-10-CM

## 2024-10-12 PROCEDURE — 77063 BREAST TOMOSYNTHESIS BI: CPT | Performed by: OBSTETRICS & GYNECOLOGY

## 2024-10-12 PROCEDURE — 77067 SCR MAMMO BI INCL CAD: CPT | Performed by: OBSTETRICS & GYNECOLOGY

## 2025-07-07 ENCOUNTER — TELEPHONE (OUTPATIENT)
Dept: INTERNAL MEDICINE CLINIC | Facility: CLINIC | Age: 55
End: 2025-07-07

## 2025-07-07 DIAGNOSIS — Z00.00 PHYSICAL EXAM, ANNUAL: Primary | ICD-10-CM

## 2025-07-07 NOTE — TELEPHONE ENCOUNTER
Per patient she has a physical appointment on 08/04/2025 and would like to know if she can have a complete blood work prior to her appointment.

## 2025-07-08 NOTE — TELEPHONE ENCOUNTER
Advised patient of fasting lab orders. Patient will get the blood work done prior to 8/4/2025 appointment.

## 2025-07-15 ENCOUNTER — TELEPHONE (OUTPATIENT)
Dept: OBGYN CLINIC | Facility: CLINIC | Age: 55
End: 2025-07-15

## 2025-07-15 NOTE — TELEPHONE ENCOUNTER
Last annual - 6/26/2024  Last mammogram - 10/12/2024, negative    Next annual 7/17/2025    Patient informed mammogram will be  due in 10/2025.  Since she has an appointment in 2 days it would be best for Dr. Tenorio to place order after breast exam.  Patient agrees with plan.

## 2025-07-17 ENCOUNTER — OFFICE VISIT (OUTPATIENT)
Dept: OBGYN CLINIC | Facility: CLINIC | Age: 55
End: 2025-07-17
Payer: COMMERCIAL

## 2025-07-17 ENCOUNTER — LAB ENCOUNTER (OUTPATIENT)
Dept: LAB | Age: 55
End: 2025-07-17
Attending: INTERNAL MEDICINE
Payer: COMMERCIAL

## 2025-07-17 VITALS
WEIGHT: 163.19 LBS | HEART RATE: 67 BPM | DIASTOLIC BLOOD PRESSURE: 73 MMHG | SYSTOLIC BLOOD PRESSURE: 119 MMHG | BODY MASS INDEX: 28 KG/M2

## 2025-07-17 DIAGNOSIS — Z12.31 VISIT FOR SCREENING MAMMOGRAM: ICD-10-CM

## 2025-07-17 DIAGNOSIS — Z00.00 PHYSICAL EXAM, ANNUAL: ICD-10-CM

## 2025-07-17 DIAGNOSIS — R73.9 ELEVATED BLOOD SUGAR: ICD-10-CM

## 2025-07-17 DIAGNOSIS — Z01.419 ENCOUNTER FOR GYNECOLOGICAL EXAMINATION WITHOUT ABNORMAL FINDING: Primary | ICD-10-CM

## 2025-07-17 LAB
ALBUMIN SERPL-MCNC: 4.6 G/DL (ref 3.2–4.8)
ALBUMIN/GLOB SERPL: 1.5 {RATIO} (ref 1–2)
ALP LIVER SERPL-CCNC: 103 U/L (ref 46–118)
ALT SERPL-CCNC: 14 U/L (ref 10–49)
ANION GAP SERPL CALC-SCNC: 6 MMOL/L (ref 0–18)
AST SERPL-CCNC: 17 U/L (ref ?–34)
BASOPHILS # BLD AUTO: 0.03 X10(3) UL (ref 0–0.2)
BASOPHILS NFR BLD AUTO: 0.5 %
BILIRUB SERPL-MCNC: 0.4 MG/DL (ref 0.3–1.2)
BUN BLD-MCNC: 9 MG/DL (ref 9–23)
BUN/CREAT SERPL: 9.9 (ref 10–20)
CALCIUM BLD-MCNC: 9.3 MG/DL (ref 8.7–10.4)
CHLORIDE SERPL-SCNC: 107 MMOL/L (ref 98–112)
CHOLEST SERPL-MCNC: 185 MG/DL (ref ?–200)
CO2 SERPL-SCNC: 28 MMOL/L (ref 21–32)
CREAT BLD-MCNC: 0.91 MG/DL (ref 0.55–1.02)
DEPRECATED RDW RBC AUTO: 37.2 FL (ref 35.1–46.3)
EGFRCR SERPLBLD CKD-EPI 2021: 74 ML/MIN/1.73M2 (ref 60–?)
EOSINOPHIL # BLD AUTO: 0.16 X10(3) UL (ref 0–0.7)
EOSINOPHIL NFR BLD AUTO: 2.6 %
ERYTHROCYTE [DISTWIDTH] IN BLOOD BY AUTOMATED COUNT: 12.6 % (ref 11–15)
EST. AVERAGE GLUCOSE BLD GHB EST-MCNC: 117 MG/DL (ref 68–126)
FASTING PATIENT LIPID ANSWER: YES
FASTING STATUS PATIENT QL REPORTED: YES
GLOBULIN PLAS-MCNC: 3.1 G/DL (ref 2–3.5)
GLUCOSE BLD-MCNC: 94 MG/DL (ref 70–99)
HBA1C MFR BLD: 5.7 % (ref ?–5.7)
HCT VFR BLD AUTO: 38.2 % (ref 35–48)
HDLC SERPL-MCNC: 58 MG/DL (ref 40–59)
HGB BLD-MCNC: 12.7 G/DL (ref 12–16)
IMM GRANULOCYTES # BLD AUTO: 0.01 X10(3) UL (ref 0–1)
IMM GRANULOCYTES NFR BLD: 0.2 %
LDLC SERPL CALC-MCNC: 111 MG/DL (ref ?–100)
LYMPHOCYTES # BLD AUTO: 1.85 X10(3) UL (ref 1–4)
LYMPHOCYTES NFR BLD AUTO: 29.9 %
MCH RBC QN AUTO: 27 PG (ref 26–34)
MCHC RBC AUTO-ENTMCNC: 33.2 G/DL (ref 31–37)
MCV RBC AUTO: 81.1 FL (ref 80–100)
MONOCYTES # BLD AUTO: 0.39 X10(3) UL (ref 0.1–1)
MONOCYTES NFR BLD AUTO: 6.3 %
NEUTROPHILS # BLD AUTO: 3.74 X10 (3) UL (ref 1.5–7.7)
NEUTROPHILS # BLD AUTO: 3.74 X10(3) UL (ref 1.5–7.7)
NEUTROPHILS NFR BLD AUTO: 60.5 %
NONHDLC SERPL-MCNC: 127 MG/DL (ref ?–130)
OSMOLALITY SERPL CALC.SUM OF ELEC: 290 MOSM/KG (ref 275–295)
PLATELET # BLD AUTO: 272 10(3)UL (ref 150–450)
POTASSIUM SERPL-SCNC: 4.7 MMOL/L (ref 3.5–5.1)
PROT SERPL-MCNC: 7.7 G/DL (ref 5.7–8.2)
RBC # BLD AUTO: 4.71 X10(6)UL (ref 3.8–5.3)
SODIUM SERPL-SCNC: 141 MMOL/L (ref 136–145)
TRIGL SERPL-MCNC: 89 MG/DL (ref 30–149)
TSI SER-ACNC: 0.73 UIU/ML (ref 0.55–4.78)
VLDLC SERPL CALC-MCNC: 15 MG/DL (ref 0–30)
WBC # BLD AUTO: 6.2 X10(3) UL (ref 4–11)

## 2025-07-17 PROCEDURE — 3074F SYST BP LT 130 MM HG: CPT | Performed by: OBSTETRICS & GYNECOLOGY

## 2025-07-17 PROCEDURE — 99396 PREV VISIT EST AGE 40-64: CPT | Performed by: OBSTETRICS & GYNECOLOGY

## 2025-07-17 PROCEDURE — 85025 COMPLETE CBC W/AUTO DIFF WBC: CPT

## 2025-07-17 PROCEDURE — 36415 COLL VENOUS BLD VENIPUNCTURE: CPT

## 2025-07-17 PROCEDURE — 80053 COMPREHEN METABOLIC PANEL: CPT

## 2025-07-17 PROCEDURE — 80061 LIPID PANEL: CPT

## 2025-07-17 PROCEDURE — 84443 ASSAY THYROID STIM HORMONE: CPT

## 2025-07-17 PROCEDURE — 3078F DIAST BP <80 MM HG: CPT | Performed by: OBSTETRICS & GYNECOLOGY

## 2025-07-17 PROCEDURE — 83036 HEMOGLOBIN GLYCOSYLATED A1C: CPT

## 2025-07-17 NOTE — PROGRESS NOTES
The following individual(s) verbally consented to be recorded using ambient AI listening technology and understand that they can each withdraw their consent to this listening technology at any point by asking the clinician to turn off or pause the recording:    Patient name: Berry Solis  Additional names:

## 2025-07-18 NOTE — PROGRESS NOTES
Berry Solis is a 55 year old female  Patient's last menstrual period was 2021.    Chief Complaint   Patient presents with    Gyn Exam     ANNUAL EXAM   -Reviewed Preventative/Wellness form with patient.     .       History of Present Illness  Berry Solis is a 55 year old female who presents for a routine gynecological follow-up.    She has no current gynecological issues.    She performs regular breast self-examinations and reports no abnormalities. She is sexually active without any issues.    She previously underwent a colonoscopy with no significant findings.    She has been working and recently went on vacation to [place]. She plans to visit [facility] at the end of the month.    No changes in her medical or surgical history since the last visit.       OBSTETRICS HISTORY:  OB History    Para Term  AB Living   0 0 0 0 0 0   SAB IAB Ectopic Multiple Live Births   0 0 0 0 0       GYNE HISTORY:   Pap Date: 24  Pap Result Notes: neg HPV neg,  Follow Up Recommendation: MAMMO 10/12/24 MELITA BENIGN      MEDICAL HISTORY:  Past Medical History:    Hemorrhoid    hemorrhoidectomy    Trigger middle finger of right hand    Visual impairment     Past Surgical History:   Procedure Laterality Date    Colonoscopy  2019    sigmoid polyp    Colonoscopy N/A 2019    Procedure: COLONOSCOPY, POSSIBLE BIOPSY, POSSIBLE POLYPECTOMY 00148;  Surgeon: Shant Atwood MD;  [place] [facility] [place]    Hemorrhoidectomy  2002    Incise finger tendon sheath Right 2023    Trigger release of right middle finger         SOCIAL HISTORY:  Social History     Socioeconomic History    Marital status: Single   Tobacco Use    Smoking status: Never     Passive exposure: Never    Smokeless tobacco: Never   Vaping Use    Vaping status: Never Used   Substance and Sexual Activity    Alcohol use: No     Alcohol/week: 0.0 standard drinks of alcohol    Drug use:  No    Sexual activity: Yes     Partners: Male     Birth control/protection: Condom     Comment: none   Other Topics Concern    Caffeine Concern Yes     Comment: soda, chocolate, occasionally    Reaction to local anesthetic No    Right Handed Yes        FAMILY HISTORY:  Family History   Problem Relation Age of Onset    Hypertension Mother     Breast Cancer Mother 66    Cancer Maternal Grandmother 60        pancreatic; non-smoker (grandpa smoked)    Pancreatic Cancer Maternal Grandmother 60    Diabetes Paternal Aunt     Breast Cancer Paternal Aunt 50    Breast Cancer Paternal Aunt 60       MEDICATIONS:    Current Outpatient Medications:     ZINC OR, Use as directed in the mouth or throat., Disp: , Rfl:     Vitamin B-12 1000 MCG Oral Tab, Take 1 tablet (1,000 mcg total) by mouth daily., Disp: , Rfl:     Ascorbic Acid (VITAMIN C) 1000 MG Oral Tab, Take 1 tablet (1,000 mg total) by mouth daily., Disp: , Rfl:     Ergocalciferol (VITAMIN D OR), Take 1 tablet by mouth daily.  , Disp: , Rfl:     Multiple Vitamins-Minerals (ONE-A-DAY WOMENS VITACRAVES) Oral Chew Tab, Chew 1 tablet by mouth daily., Disp: , Rfl:     amoxicillin clavulanate 875-125 MG Oral Tab, Take 1 tablet by mouth 2 (two) times daily., Disp: 10 tablet, Rfl: 0    BIOTIN OR, Take by mouth. (Patient not taking: Reported on 7/17/2025), Disp: , Rfl:     ALLERGIES:  No Known Allergies    Blood pressure 119/73, pulse 67, weight 163 lb 3.2 oz (74 kg), last menstrual period 11/01/2021, not currently breastfeeding.    Review of Systems:  Constitutional:  Denies fatigue, night sweats, hot flashes  Eyes:  denies blurred or double vision  Cardiovascular:  denies chest pain or palpitations  Respiratory:  denies shortness of breath  Gastrointestinal:  denies heartburn, abdominal pain, diarrhea or constipation  Genitourinary:  denies dysuria, incontinence, abnormal vaginal discharge, vaginal itching  Musculoskeletal:  denies back pain.  Skin/Breast:  Denies any breast pain,  lumps, or discharge.   Neurological:  denies headaches, extremity weakness or numbness.  Psychiatric: denies depression or anxiety.  Endocrine:   denies excessive thirst or urination.  Heme/Lymph:  denies history of anemia, easy bruising or bleeding.    Depression Screening (PHQ-2/PHQ-9): Over the LAST 2 WEEKS   Little interest or pleasure in doing things (over the last two weeks)?: Not at all    Feeling down, depressed, or hopeless (over the last two weeks)?: Not at all    PHQ-2 SCORE: 0           PHYSICAL EXAM:   Constitutional: well developed, well nourished  Head/Face: normocephalic  Neck/Thyroid: thyroid symmetric, no thyromegaly, no nodules, no adenopathy  Lymphatic:no abnormal supraclavicular or axillary adenopathy is noted  Breast: normal without palpable masses, tenderness, asymmetry, nipple discharge, nipple retraction or skin changes  Respiratory:  lungs clear to auscultation bilaterally  Cardiovascular: regular rate and rhythm, no significant murmur  Abdomen:  soft, nontender, nondistended, no masses  Skin/Hair: no unusual rashes or bruises  Extremities: no edema, no cyanosis  Psychiatric:  Oriented to time, place, person and situation. Appropriate mood and affect    Pelvic Exam:  External Genitalia: normal appearance, hair distribution, and no lesions  Urethral Meatus:  normal in size, location, without lesions and prolapse  Bladder:  No fullness, masses or tenderness  Vagina:  Normal appearance without lesions, no abnormal discharge  Cervix:  Normal without tenderness on motion  Uterus: normal in size, contour, position, mobility, without tenderness  Adnexa: normal without masses or tenderness  Perineum: normal  Anus: no hemorroids     Results  LABS  HPV: Negative (2024)    DIAGNOSTIC  Colonoscopy: No findings    PATHOLOGY  Pap smear: Negative (2024)       Assessment & Plan:    Encounter Diagnoses   Name Primary?    Encounter for gynecological examination without abnormal finding Yes    Visit for  screening mammogram        Assessment & Plan  General Health Maintenance  55-year-old female with stable gynecological and medical history. Negative Pap smear and HPV test last year. Colonoscopy with 10-year follow-up interval. Mammogram due in October.  - Schedule mammogram for October.  - No Pap smear or HPV test needed this year.  - No STD testing needed.  - Continue routine breast self-examinations.  - Follow up in 10 years for colonoscopy unless symptoms arise.     SBE encouraged  No orders of the defined types were placed in this encounter.      Requested Prescriptions      No prescriptions requested or ordered in this encounter       [facility] YE 2D+3D SCREENING BILAT (CPT=77067/64791)

## 2025-08-04 ENCOUNTER — OFFICE VISIT (OUTPATIENT)
Dept: INTERNAL MEDICINE CLINIC | Facility: CLINIC | Age: 55
End: 2025-08-04

## 2025-08-04 VITALS
BODY MASS INDEX: 28.54 KG/M2 | HEART RATE: 76 BPM | DIASTOLIC BLOOD PRESSURE: 73 MMHG | SYSTOLIC BLOOD PRESSURE: 116 MMHG | HEIGHT: 64 IN | OXYGEN SATURATION: 97 % | WEIGHT: 167.19 LBS

## 2025-08-04 DIAGNOSIS — Z00.00 PHYSICAL EXAM, ANNUAL: Primary | ICD-10-CM

## 2025-08-04 DIAGNOSIS — R73.03 PREDIABETES: ICD-10-CM

## 2025-08-04 DIAGNOSIS — H61.23 BILATERAL IMPACTED CERUMEN: ICD-10-CM

## (undated) DEVICE — SOL NACL IRRIG 0.9% 1000ML BTL

## (undated) DEVICE — PLASTIC HAND: Brand: MEDLINE INDUSTRIES, INC.

## (undated) DEVICE — SUT ETHILON 4-0 P-3 699G

## (undated) DEVICE — GAMMEX® PI HYBRID SIZE 7, STERILE POWDER-FREE SURGICAL GLOVE, POLYISOPRENE AND NEOPRENE BLEND: Brand: GAMMEX

## (undated) NOTE — MR AVS SNAPSHOT
After Visit Summary   7/20/2020    Sarah Walsh    MRN: [id number]           Visit Information     Date & Time  7/20/2020  2:00 PM Provider  Braulio Le MD [address] [place] [address], [address] [place] [address],[address], [facility].  Phone Facility, call Central Scheduling at [phone], Monday through Friday between 7:30am to 6pm and on Saturday between 8am and 1pm. Evening and weekend appointments for your exam are available.      [facility]  D Treatment for mild illness or injury that does not require immediate attention VIDEO VISITS  Average cost  $35*    e-VISTS  Average cost  $35*     SAME DAY APPOINTMENTS   Available at primary care offices    [address] [place] [address]  OFFICE VISIT   [facility]

## (undated) NOTE — ED AVS SNAPSHOT
[facility] AND [facility] in [address] [address] Keny Umana    Phone:  [phone]    Fax:  [phone] [address]   MRN: [id number]    Department:  [facility] AND [facility] in [address] [place] [address]   Date of Visit:  2 Bring a paper prescription for each of these medications    - benzonatate 200 MG Caps  - Fluticasone Propionate 50 MCG/ACT Susp  - Pseudoephedrine HCl 30 MG Tabs              Discharge Instructions       Plenty of fluids and rest.    Please call on the ne does not uncover every injury or illness.  If you have been referred to a primary care or a specialist physician for a follow-up visit, please tell this physician (or your personal doctor if your instructions are to return to your personal doctor) about any Brian ([address]) [address]) [phone]   [place] [address] [employer] ([address]) [address] [facility] [employer].  ([address] [facility]

## (undated) NOTE — LETTER
7/24/2020              Berry Solis        [address] [place] [address] [place] [address]         Dear Edinson Ramos,    It was a pleasure to see you.   Your pap, and HPV is neg, repeat pap needed in 3 years, return to clinic 1 year for annual exam

## (undated) NOTE — MR AVS SNAPSHOT
[facility] SPECIALTY [facility] [place] [address] [address]  [address] [phone]               Thank you for choosing us for your health care visit with Kameron Amaral MD.  We are glad to serve you and happy to provide you with this summary of Take  by mouth. [employer]     Call the [employer] for assistance with your inactive [employer] account    If you have questions, you can call [phone] to talk to our [facility] Staff.  Remember, [employer] is NOT to be used for urgent nee

## (undated) NOTE — LETTER
23      Patient: Feliciano Valentine  : 1970 Visit date: 2023    Dear Robbi Edouard,      I examined your patient in follow-up today. She has recurrent right middle finger triggering post steroid injection. We will plan on surgical trigger release. Thank you for your kind referral. If I may answer any questions, please feel free to contact me.      Sincerely,   Topher Valdes MD     CC: No Recipients

## (undated) NOTE — MR AVS SNAPSHOT
After Visit Summary   6/26/2024    Berry Solis   MRN: [id number]           Visit Information     Date & Time  6/26/2024  3:40 PM Provider  Jackie Tenorio MD [facility] - OB/GYN Dept. Phone  [phone]      Your Vitals Were  Most recent update: 6/26/2024  3:55 PM    BP   111/73    Pulse   79    Wt   166 lb    LMP   11/01/2021    BMI   28.49 kg/m²         Allergies as of 6/26/2024  Review status set to Review Complete on 6/26/2024   No Known Allergies     Your Current Medications        Dosage    ZINC OR Use as directed in the mouth or throat.    Vitamin B-12 1000 MCG Oral Tab Take 1 tablet (1,000 mcg total) by mouth daily.    Ascorbic Acid (VITAMIN C) 1000 MG Oral Tab Take 1 tablet (1,000 mg total) by mouth daily.    BIOTIN OR Take by mouth.    Ergocalciferol (VITAMIN D OR) Take 1 tablet by mouth daily.      Multiple Vitamins-Minerals (ONE-A-DAY WOMENS VITACRAVES) Oral Chew Tab Chew 1 tablet by mouth daily.      Diagnoses for This Visit    Encounter for gynecological examination without abnormal finding   [340427]  -  Primary  Visit for screening mammogram   [205345]             We Ordered the Following     Normal Orders This Visit    Hpv Dna  High Risk , Thin Prep Collect [QSL2951 CUSTOM]     THINPREP PAP SMEAR ONLY [[id number] CUSTOM]     ThinPrep PAP Smear [[id number] CUSTOM]     Future Labs/Procedures Expected by Expires    [facility] YE 2D+3D SCREENING BILAT (CPT=77067/24996) [COMBO CPT(R)]  6/26/2024 (Approximate) 6/26/2025      Future Appointments        Provider Department    7/29/2024 9:00 AM Yocasta Mcelroy [facility]    8/28/2024 4:00 PM [address] [facility]      Imaging Scheduling Instructions     Around June 26, 2024   Imaging:   [facility] YE 2D+3D SCREENING BILAT (CPT=77067/65553)    Instructions: Your order will generate a \"Scheduling Ticket\" that will  be available in [employer] to schedule on your own at a time most convenient to you.      If you do not have a [employer] Account, or if you prefer to speak with someone to schedule your appointment, please call [employer] Central Scheduling at [phone].                  Did you know that [facility] primary care physicians now offer Video Visits through [employer] for adult patients for a variety of conditions such as allergies, back pain and cold symptoms? Skip the drive and waiting room and online chat with a doctor face-to-face using your web-cam enabled computer or mobile device wherever you are. Video Visits cost $50 and can be paid hassle-free using a credit, debit, or health savings card.  Not active on [employer]? Ask us how to get signed up today!          If you receive a survey from [employer], please take a few minutes to complete it and provide feedback. We strive to deliver the best patient experience and are looking for ways to make improvements. Your feedback will help us do so. For more information on [employer], please visit [email or web address]           No text in SmartText           No text in SmartText

## (undated) NOTE — LETTER
23      Patient: Bryna Roman  : 1970 Visit date: 2023    Dear Flaco Barrientos,      I examined your patient in consultation today. She has painful triggering with locking of the right middle finger. We performed a steroid injection today. Thank you for your kind referral. If I may answer any questions, please feel free to contact me.      Sincerely,   Amilcar Laboy MD     CC:   No Recipients

## (undated) NOTE — LETTER
2/19/2019          To Whom It May Concern:    Aniket Aden is currently under my medical care and may not return to work at this time. Please excuse Belkis Escalera for 1 days. She may return to work on 2/20/2019. Activity is restricted as follows: none.

## (undated) NOTE — LETTER
2/19/2019          To Whom It May Concern:    Alex Duncan is currently under my medical care and may not return to work at this time. Please excuse Rai Coyle for 2 days. She may return to work on 2/21/2019. Activity is restricted as follows: none.